# Patient Record
Sex: MALE | Race: WHITE | URBAN - METROPOLITAN AREA
[De-identification: names, ages, dates, MRNs, and addresses within clinical notes are randomized per-mention and may not be internally consistent; named-entity substitution may affect disease eponyms.]

---

## 2024-04-13 ENCOUNTER — EMERGENCY (EMERGENCY)
Facility: HOSPITAL | Age: 79
LOS: 0 days | Discharge: HOME | End: 2024-04-14
Attending: EMERGENCY MEDICINE
Payer: MEDICARE

## 2024-04-13 VITALS
RESPIRATION RATE: 18 BRPM | TEMPERATURE: 98 F | DIASTOLIC BLOOD PRESSURE: 54 MMHG | SYSTOLIC BLOOD PRESSURE: 99 MMHG | OXYGEN SATURATION: 97 % | HEART RATE: 70 BPM | WEIGHT: 164.91 LBS

## 2024-04-13 DIAGNOSIS — R55 SYNCOPE AND COLLAPSE: ICD-10-CM

## 2024-04-13 DIAGNOSIS — F03.90 UNSPECIFIED DEMENTIA WITHOUT BEHAVIORAL DISTURBANCE: ICD-10-CM

## 2024-04-13 DIAGNOSIS — I25.10 ATHEROSCLEROTIC HEART DISEASE OF NATIVE CORONARY ARTERY WITHOUT ANGINA PECTORIS: ICD-10-CM

## 2024-04-13 DIAGNOSIS — I51.7 CARDIOMEGALY: ICD-10-CM

## 2024-04-13 DIAGNOSIS — E78.5 HYPERLIPIDEMIA, UNSPECIFIED: ICD-10-CM

## 2024-04-13 DIAGNOSIS — I27.20 PULMONARY HYPERTENSION, UNSPECIFIED: ICD-10-CM

## 2024-04-13 DIAGNOSIS — J01.90 ACUTE SINUSITIS, UNSPECIFIED: ICD-10-CM

## 2024-04-13 DIAGNOSIS — R07.89 OTHER CHEST PAIN: ICD-10-CM

## 2024-04-13 LAB
ALBUMIN SERPL ELPH-MCNC: 3.8 G/DL — SIGNIFICANT CHANGE UP (ref 3.5–5.2)
ALP SERPL-CCNC: 116 U/L — HIGH (ref 30–115)
ALT FLD-CCNC: 12 U/L — SIGNIFICANT CHANGE UP (ref 0–41)
ANION GAP SERPL CALC-SCNC: 13 MMOL/L — SIGNIFICANT CHANGE UP (ref 7–14)
AST SERPL-CCNC: 29 U/L — SIGNIFICANT CHANGE UP (ref 0–41)
BASOPHILS # BLD AUTO: 0.03 K/UL — SIGNIFICANT CHANGE UP (ref 0–0.2)
BASOPHILS NFR BLD AUTO: 0.3 % — SIGNIFICANT CHANGE UP (ref 0–1)
BILIRUB SERPL-MCNC: 0.3 MG/DL — SIGNIFICANT CHANGE UP (ref 0.2–1.2)
BUN SERPL-MCNC: 26 MG/DL — HIGH (ref 10–20)
CALCIUM SERPL-MCNC: 9.1 MG/DL — SIGNIFICANT CHANGE UP (ref 8.4–10.5)
CHLORIDE SERPL-SCNC: 102 MMOL/L — SIGNIFICANT CHANGE UP (ref 98–110)
CO2 SERPL-SCNC: 22 MMOL/L — SIGNIFICANT CHANGE UP (ref 17–32)
CREAT SERPL-MCNC: 1.4 MG/DL — SIGNIFICANT CHANGE UP (ref 0.7–1.5)
EGFR: 51 ML/MIN/1.73M2 — LOW
EOSINOPHIL # BLD AUTO: 0.12 K/UL — SIGNIFICANT CHANGE UP (ref 0–0.7)
EOSINOPHIL NFR BLD AUTO: 1.4 % — SIGNIFICANT CHANGE UP (ref 0–8)
GLUCOSE SERPL-MCNC: 153 MG/DL — HIGH (ref 70–99)
HCT VFR BLD CALC: 37.9 % — LOW (ref 42–52)
HGB BLD-MCNC: 12.4 G/DL — LOW (ref 14–18)
IMM GRANULOCYTES NFR BLD AUTO: 0.6 % — HIGH (ref 0.1–0.3)
LYMPHOCYTES # BLD AUTO: 1.54 K/UL — SIGNIFICANT CHANGE UP (ref 1.2–3.4)
LYMPHOCYTES # BLD AUTO: 17.5 % — LOW (ref 20.5–51.1)
MAGNESIUM SERPL-MCNC: 2.3 MG/DL — SIGNIFICANT CHANGE UP (ref 1.8–2.4)
MCHC RBC-ENTMCNC: 31.3 PG — HIGH (ref 27–31)
MCHC RBC-ENTMCNC: 32.7 G/DL — SIGNIFICANT CHANGE UP (ref 32–37)
MCV RBC AUTO: 95.7 FL — HIGH (ref 80–94)
MONOCYTES # BLD AUTO: 0.72 K/UL — HIGH (ref 0.1–0.6)
MONOCYTES NFR BLD AUTO: 8.2 % — SIGNIFICANT CHANGE UP (ref 1.7–9.3)
NEUTROPHILS # BLD AUTO: 6.35 K/UL — SIGNIFICANT CHANGE UP (ref 1.4–6.5)
NEUTROPHILS NFR BLD AUTO: 72 % — SIGNIFICANT CHANGE UP (ref 42.2–75.2)
NRBC # BLD: 0 /100 WBCS — SIGNIFICANT CHANGE UP (ref 0–0)
PLATELET # BLD AUTO: 238 K/UL — SIGNIFICANT CHANGE UP (ref 130–400)
PMV BLD: 11.9 FL — HIGH (ref 7.4–10.4)
POTASSIUM SERPL-MCNC: 5.2 MMOL/L — HIGH (ref 3.5–5)
POTASSIUM SERPL-SCNC: 5.2 MMOL/L — HIGH (ref 3.5–5)
PROT SERPL-MCNC: 6.3 G/DL — SIGNIFICANT CHANGE UP (ref 6–8)
RBC # BLD: 3.96 M/UL — LOW (ref 4.7–6.1)
RBC # FLD: 12.5 % — SIGNIFICANT CHANGE UP (ref 11.5–14.5)
SODIUM SERPL-SCNC: 137 MMOL/L — SIGNIFICANT CHANGE UP (ref 135–146)
TROPONIN T, HIGH SENSITIVITY RESULT: 38 NG/L — HIGH (ref 6–21)
TROPONIN T, HIGH SENSITIVITY RESULT: 42 NG/L — HIGH (ref 6–21)
TROPONIN T, HIGH SENSITIVITY RESULT: 44 NG/L — HIGH (ref 6–21)
WBC # BLD: 8.81 K/UL — SIGNIFICANT CHANGE UP (ref 4.8–10.8)
WBC # FLD AUTO: 8.81 K/UL — SIGNIFICANT CHANGE UP (ref 4.8–10.8)

## 2024-04-13 PROCEDURE — 80053 COMPREHEN METABOLIC PANEL: CPT

## 2024-04-13 PROCEDURE — G0378: CPT

## 2024-04-13 PROCEDURE — 93306 TTE W/DOPPLER COMPLETE: CPT

## 2024-04-13 PROCEDURE — 85025 COMPLETE CBC W/AUTO DIFF WBC: CPT

## 2024-04-13 PROCEDURE — 71045 X-RAY EXAM CHEST 1 VIEW: CPT | Mod: 26

## 2024-04-13 PROCEDURE — 84484 ASSAY OF TROPONIN QUANT: CPT

## 2024-04-13 PROCEDURE — 93005 ELECTROCARDIOGRAM TRACING: CPT

## 2024-04-13 PROCEDURE — 93010 ELECTROCARDIOGRAM REPORT: CPT

## 2024-04-13 PROCEDURE — 83735 ASSAY OF MAGNESIUM: CPT

## 2024-04-13 PROCEDURE — 36415 COLL VENOUS BLD VENIPUNCTURE: CPT

## 2024-04-13 PROCEDURE — 99223 1ST HOSP IP/OBS HIGH 75: CPT

## 2024-04-13 PROCEDURE — 99285 EMERGENCY DEPT VISIT HI MDM: CPT

## 2024-04-13 PROCEDURE — 71045 X-RAY EXAM CHEST 1 VIEW: CPT

## 2024-04-13 RX ORDER — NIFEDIPINE 30 MG
60 TABLET, EXTENDED RELEASE 24 HR ORAL DAILY
Refills: 0 | Status: DISCONTINUED | OUTPATIENT
Start: 2024-04-13 | End: 2024-04-14

## 2024-04-13 RX ORDER — SODIUM CHLORIDE 9 MG/ML
1000 INJECTION, SOLUTION INTRAVENOUS ONCE
Refills: 0 | Status: COMPLETED | OUTPATIENT
Start: 2024-04-13 | End: 2024-04-13

## 2024-04-13 RX ORDER — LANOLIN ALCOHOL/MO/W.PET/CERES
5 CREAM (GRAM) TOPICAL AT BEDTIME
Refills: 0 | Status: DISCONTINUED | OUTPATIENT
Start: 2024-04-13 | End: 2024-04-14

## 2024-04-13 RX ORDER — DIVALPROEX SODIUM 500 MG/1
250 TABLET, DELAYED RELEASE ORAL DAILY
Refills: 0 | Status: DISCONTINUED | OUTPATIENT
Start: 2024-04-13 | End: 2024-04-14

## 2024-04-13 RX ORDER — FOLIC ACID 0.8 MG
1 TABLET ORAL DAILY
Refills: 0 | Status: DISCONTINUED | OUTPATIENT
Start: 2024-04-13 | End: 2024-04-14

## 2024-04-13 RX ORDER — DONEPEZIL HYDROCHLORIDE 10 MG/1
10 TABLET, FILM COATED ORAL AT BEDTIME
Refills: 0 | Status: DISCONTINUED | OUTPATIENT
Start: 2024-04-13 | End: 2024-04-14

## 2024-04-13 RX ORDER — METOPROLOL TARTRATE 50 MG
25 TABLET ORAL
Refills: 0 | Status: DISCONTINUED | OUTPATIENT
Start: 2024-04-13 | End: 2024-04-14

## 2024-04-13 RX ORDER — CITALOPRAM 10 MG/1
20 TABLET, FILM COATED ORAL DAILY
Refills: 0 | Status: DISCONTINUED | OUTPATIENT
Start: 2024-04-13 | End: 2024-04-14

## 2024-04-13 RX ORDER — ATORVASTATIN CALCIUM 80 MG/1
80 TABLET, FILM COATED ORAL AT BEDTIME
Refills: 0 | Status: DISCONTINUED | OUTPATIENT
Start: 2024-04-13 | End: 2024-04-14

## 2024-04-13 RX ADMIN — ATORVASTATIN CALCIUM 80 MILLIGRAM(S): 80 TABLET, FILM COATED ORAL at 22:34

## 2024-04-13 RX ADMIN — DIVALPROEX SODIUM 250 MILLIGRAM(S): 500 TABLET, DELAYED RELEASE ORAL at 22:34

## 2024-04-13 RX ADMIN — Medication 5 MILLIGRAM(S): at 22:34

## 2024-04-13 RX ADMIN — CITALOPRAM 20 MILLIGRAM(S): 10 TABLET, FILM COATED ORAL at 22:34

## 2024-04-13 RX ADMIN — Medication 25 MILLIGRAM(S): at 21:28

## 2024-04-13 RX ADMIN — DONEPEZIL HYDROCHLORIDE 10 MILLIGRAM(S): 10 TABLET, FILM COATED ORAL at 22:34

## 2024-04-13 NOTE — ED CDU PROVIDER INITIAL DAY NOTE - PROGRESS NOTE DETAILS
received signout from Janes Lawson - in obs for evaluation of  syncope; ce/ekg x 2 42->44 will repeat 3rd trop

## 2024-04-13 NOTE — ED PROVIDER NOTE - CLINICAL SUMMARY MEDICAL DECISION MAKING FREE TEXT BOX
78-year-old man, history of hypertension presents for syncopal episode without significant prodrome.  No prior episodes, no chest pain, shortness of breath, palpitations.  No recent cardiac workup.  Vital signs, exam as noted, patient nontoxic-appearing, lungs clear, CV S1-S2, abdomen soft, nontender.  EKG interpreted by me, Dr. Malinda Domingo, normal sinus@64, normal axis, normal intervals, no acute ST–T changes.  Chest x-ray interpreted by me, normal cardiac silhouette, no pneumothorax, no infiltrate.  Labs unremarkable with troponin 42.  Symptoms likely orthostatic, however given age, and relative lack of prodromal symptoms, elevated troponin, patient to be placed in CDU for monitoring and further evaluation.

## 2024-04-13 NOTE — ED PROVIDER NOTE - PHYSICAL EXAMINATION
Vital Signs: I have reviewed the initial vital signs.  Constitutional: chronically ill appearing, no acute distress  HEENT: Airway patent, moist MM, EOMI,   CV: regular rate, regular rhythm, well-perfused extremities,   Lungs: Clear to ascultation bilaterally,   ABD: Non-tender, Non-distended,   MSK: Neck supple, nontender, normal range of motion,   INTEG: Skin warm, dry, no rash.  NEURO: A&Ox3, moving all extremities, normal speech

## 2024-04-13 NOTE — ED CDU PROVIDER INITIAL DAY NOTE - CLINICAL SUMMARY MEDICAL DECISION MAKING FREE TEXT BOX
Pt presents with syncope. Placed into observation for monitoring and for echo. Pt takes meds for HTN, psych, HLD and for dementia.

## 2024-04-13 NOTE — ED PROVIDER NOTE - CONSIDERATION OF ADMISSION OBSERVATION
Consideration of Admission/Observation Pt requires telemetry, serial EKG/enzymes ,advanced cardiac imaging

## 2024-04-13 NOTE — ED PROVIDER NOTE - OBJECTIVE STATEMENT
78y M hx of HTN presenting for eval of syncopal episode. Patient was walking and then felt "something was off". Afterwards slumped down to the floor. (-) HT, (-) LoC. Patient denies preceding chest pain, palpitations, never had anything like this happen before. Does not recall if he's had a recent cardiac work up.

## 2024-04-14 VITALS
RESPIRATION RATE: 18 BRPM | SYSTOLIC BLOOD PRESSURE: 131 MMHG | OXYGEN SATURATION: 96 % | TEMPERATURE: 98 F | HEART RATE: 65 BPM | DIASTOLIC BLOOD PRESSURE: 63 MMHG

## 2024-04-14 PROCEDURE — 99284 EMERGENCY DEPT VISIT MOD MDM: CPT

## 2024-04-14 PROCEDURE — 93306 TTE W/DOPPLER COMPLETE: CPT | Mod: 26

## 2024-04-14 PROCEDURE — 99238 HOSP IP/OBS DSCHRG MGMT 30/<: CPT

## 2024-04-14 RX ADMIN — Medication 25 MILLIGRAM(S): at 16:04

## 2024-04-14 RX ADMIN — Medication 1 MILLIGRAM(S): at 10:13

## 2024-04-14 RX ADMIN — ATORVASTATIN CALCIUM 80 MILLIGRAM(S): 80 TABLET, FILM COATED ORAL at 22:19

## 2024-04-14 RX ADMIN — Medication 60 MILLIGRAM(S): at 06:49

## 2024-04-14 RX ADMIN — Medication 5 MILLIGRAM(S): at 22:19

## 2024-04-14 RX ADMIN — DONEPEZIL HYDROCHLORIDE 10 MILLIGRAM(S): 10 TABLET, FILM COATED ORAL at 22:19

## 2024-04-14 RX ADMIN — Medication 25 MILLIGRAM(S): at 06:49

## 2024-04-14 RX ADMIN — CITALOPRAM 20 MILLIGRAM(S): 10 TABLET, FILM COATED ORAL at 10:13

## 2024-04-14 RX ADMIN — DIVALPROEX SODIUM 250 MILLIGRAM(S): 500 TABLET, DELAYED RELEASE ORAL at 10:12

## 2024-04-14 NOTE — ED CDU PROVIDER SUBSEQUENT DAY NOTE - PROGRESS NOTE DETAILS
patient signed out from pa fearns, no complaint will, continue to monitor awaiting echo spoke to dr. mckoy cardiology fellow will evaluate patient spoke to cardiology fellow dr. alexandra patient can be seen outpatient cardiology case d/w dr. huffman

## 2024-04-14 NOTE — CONSULT NOTE ADULT - SUBJECTIVE AND OBJECTIVE BOX
Patient seen and evaluated at bedside    Chief Complaint:    HPI:   78y M hx of HTN presenting for eval of syncopal episode. Patient was walking and then felt "something was off". Afterwards slumped down to the floor. (-) HT, (-) LoC. Patient denies preceding chest pain, palpitations, never had anything like this happen before. Does not recall if he's had a recent cardiac work up.   Troponin T, High Sensitivity: 38 ng/L (04-13-24 @ 20:17)  Troponin T, High Sensitivity: 44 ng/L (04-13-24 @ 16:54)  Troponin T, High Sensitivity: 42 ng/L (04-13-24 @ 14:05)        PMHx:       PSHx:       Allergies:  No Known Allergies      Home Meds:    Current Medications:   atorvastatin 80 milliGRAM(s) Oral at bedtime  busPIRone 10 milliGRAM(s) Oral once  citalopram 20 milliGRAM(s) Oral daily  divalproex  milliGRAM(s) Oral daily  donepezil 10 milliGRAM(s) Oral at bedtime  folic acid 1 milliGRAM(s) Oral daily  melatonin. 5 milliGRAM(s) Oral at bedtime  metoprolol tartrate 25 milliGRAM(s) Oral two times a day  NIFEdipine XL 60 milliGRAM(s) Oral daily      FAMILY HISTORY:      Social History:  Smoking History:  Alcohol Use:  Drug Use:    REVIEW OF SYSTEMS:  Constitutional:     [ ] negative [ ] fevers [ ] chills [ ] weight loss [ ] weight gain  HEENT:                  [ ] negative [ ] dry eyes [ ] eye irritation [ ] postnasal drip [ ] nasal congestion  CV:                         [ ] negative  [ ] chest pain [ ] orthopnea [ ] palpitations [ ] murmur  Resp:                     [ ] negative [ ] cough [ ] shortness of breath [ ] dyspnea [ ] wheezing [ ] sputum [ ]hemoptysis  GI:                          [ ] negative [ ] nausea [ ] vomiting [ ] diarrhea [ ] constipation [ ] abd pain [ ] dysphagia   :                        [ ] negative [ ] dysuria [ ] nocturia [ ] hematuria [ ] increased urinary frequency  Musculoskeletal: [ ] negative [ ] back pain [ ] myalgias [ ] arthralgias [ ] fracture  Skin:                       [ ] negative [ ] rash [ ] itch  Neurological:        [ ] negative [ ] headache [ ] dizziness [ ] syncope [ ] weakness [ ] numbness  Psychiatric:           [ ] negative [ ] anxiety [ ] depression  Endocrine:            [ ] negative [ ] diabetes [ ] thyroid problem  Heme/Lymph:      [ ] negative [ ] anemia [ ] bleeding problem  Allergic/Immune: [ ] negative [ ] itchy eyes [ ] nasal discharge [ ] hives [ ] angioedema    [ ] All other systems negative  [ ] Unable to assess ROS due to      Physical Exam:  T(F): 97.8 (04-14), Max: 98.2 (04-14)  HR: 76 (04-14) (62 - 77)  BP: 136/64 (04-14) (127/60 - 164/77)  RR: 18 (04-14)  SpO2: 95% (04-14)  GENERAL: No acute distress, well-developed  HEAD:  Atraumatic, Normocephalic  ENT: EOMI, PERRLA, conjunctiva and sclera clear, Neck supple, No JVD, moist mucosa  CHEST/LUNG: Clear to auscultation bilaterally; No wheeze, equal breath sounds bilaterally   BACK: No spinal tenderness  HEART: Regular rate and rhythm; No murmurs, rubs, or gallops  ABDOMEN: Soft, Nontender, Nondistended; Bowel sounds present  EXTREMITIES:  No clubbing, cyanosis, or edema  PSYCH: Nl behavior, nl affect  NEUROLOGY: AAOx3, non-focal, cranial nerves intact  SKIN: Normal color, No rashes or lesions  LINES:    Cardiovascular Diagnostic Testing:    ECG: Personally reviewed:    Echo: Personally reviewed:  Summary:   1. Normal global left ventricular systolic function with a biplane EF of   56%. Severe (grade 3) diastolic dysfunction. No regional wall motion   abnormalities. Moderate concentric hypertrophy along with predominant   basal septal hypertrophy of 2.0cm.   2. Normal right ventricular size and function.   3. Moderately enlarged left atrium.   4. Moderate mitral valve regurgitation.   5. Sclerotic aortic valve with decreased opening and mild-to-moderate   regurgitation.   6. Moderate tricuspid regurgitation.   7. Severe pulmonary hypertension (PASP of at least 72mmHg; the IVC was   suboptimally visualized).   8. There is no evidence of pericardial effusion.   9. Recommend cardiac MRI for further evaluation of asymmetric septal   hypertrophy.    PHYSICIAN INTERPRETATION:  Left Ventricle: Left ventricular wall thickness is increased. There is   moderate concentric left ventricular hypertrophy. Global LV systolic   function was normal. Spectral Doppler shows restrictive pattern of left   ventricular myocardial filling (Grade III diastolic dysfunction).   Elevated left atrial and left ventricular end-diastolic pressures.  Right Ventricle: Normal right ventricular size and function.  Left Atrium: Moderately enlarged left atrium. LA volume Index is 45.1   ml/m² ml/m2.  Right Atrium: Normal right atrial size. RA Area is 12.40 cm² cm2.  Pericardium: There is no evidence of pericardial effusion.  Mitral Valve: Moderate mitral valve regurgitation is seen.  Tricuspid Valve: Structurally normal tricuspid valve, with normal leaflet   excursion. Moderate tricuspid regurgitation is visualized. Estimated   pulmonary artery systolic pressure is 71.6 mmHg assuming a right atrial   pressure of 3 mmHg, which is consistent with severe pulmonary   hypertension.  Aortic Valve: The aortic valve is trileaflet. Sclerotic aortic valve with   decreased opening. Mild to moderate aortic valve regurgitation is seen.  Pulmonic Valve: The pulmonic valve was not well visualized. Trace   pulmonic valve regurgitation.  Aorta: The aortic root and ascending aorta are structurally normal, with   no evidence of dilitation.  Venous: The inferior vena cava is not well visualized.    Stress Testing:      ECG   Ventricular Rate 64 BPM    Atrial Rate 64 BPM    P-R Interval 152 ms    QRS Duration 80 ms    Q-T Interval 438 ms    QTC Calculation(Bazett) 451 ms    P Axis 76 degrees    R Axis 48 degrees    T Axis 7 degrees    Diagnosis Line Normal sinus rhythm  Normal ECG    Confirmed by Nash Dent ([id number]) on 4/13/2024 2:23:41 PM  Cath:    Imaging:    CXR: Personally reviewed    Labs: Personally reviewed                        12.4   8.81  )-----------( 238      ( 13 Apr 2024 14:05 )             37.9     04-13    137  |  102  |  26<H>  ----------------------------<  153<H>  5.2<H>   |  22  |  1.4    Ca    9.1      13 Apr 2024 14:05  Mg     2.3     04-13    TPro  6.3  /  Alb  3.8  /  TBili  0.3  /  DBili  x   /  AST  29  /  ALT  12  /  AlkPhos  116<H>  04-13             Patient seen and evaluated at bedside    Chief Complaint:    HPI:   78y M hx of HTN presenting for eval of syncopal episode. Patient was walking and then felt "something was off". Afterwards slumped down to the floor. (-) HT, (-) LoC. Patient denies preceding chest pain, palpitations, never had anything like this happen before. Does not recall if he's had a recent cardiac work up.   Troponin T, High Sensitivity: 38 ng/L (04-13-24 @ 20:17)  Troponin T, High Sensitivity: 44 ng/L (04-13-24 @ 16:54)  Troponin T, High Sensitivity: 42 ng/L (04-13-24 @ 14:05)        PMHx:       PSHx:       Allergies:  No Known Allergies      Home Meds:    Current Medications:   atorvastatin 80 milliGRAM(s) Oral at bedtime  busPIRone 10 milliGRAM(s) Oral once  citalopram 20 milliGRAM(s) Oral daily  divalproex  milliGRAM(s) Oral daily  donepezil 10 milliGRAM(s) Oral at bedtime  folic acid 1 milliGRAM(s) Oral daily  melatonin. 5 milliGRAM(s) Oral at bedtime  metoprolol tartrate 25 milliGRAM(s) Oral two times a day  NIFEdipine XL 60 milliGRAM(s) Oral daily      FAMILY HISTORY:      Social History:  Smoking History:  Alcohol Use:  Drug Use:    REVIEW OF SYSTEMS:  Constitutional:     [ ] negative [ ] fevers [ ] chills [ ] weight loss [ ] weight gain  HEENT:                  [ ] negative [ ] dry eyes [ ] eye irritation [ ] postnasal drip [ ] nasal congestion  CV:                         [ ] negative  [ ] chest pain [ ] orthopnea [ ] palpitations [ ] murmur  Resp:                     [ ] negative [ ] cough [ ] shortness of breath [ ] dyspnea [ ] wheezing [ ] sputum [ ]hemoptysis  GI:                          [ ] negative [ ] nausea [ ] vomiting [ ] diarrhea [ ] constipation [ ] abd pain [ ] dysphagia   :                        [ ] negative [ ] dysuria [ ] nocturia [ ] hematuria [ ] increased urinary frequency  Musculoskeletal: [ ] negative [ ] back pain [ ] myalgias [ ] arthralgias [ ] fracture  Skin:                       [ ] negative [ ] rash [ ] itch  Neurological:        [ ] negative [ ] headache [ ] dizziness [ ] syncope [ ] weakness [ ] numbness  Psychiatric:           [ ] negative [ ] anxiety [ ] depression  Endocrine:            [ ] negative [ ] diabetes [ ] thyroid problem  Heme/Lymph:      [ ] negative [ ] anemia [ ] bleeding problem  Allergic/Immune: [ ] negative [ ] itchy eyes [ ] nasal discharge [ ] hives [ ] angioedema    [ ] All other systems negative  [ ] Unable to assess ROS due to      Physical Exam:  T(F): 97.8 (04-14), Max: 98.2 (04-14)  HR: 76 (04-14) (62 - 77)  BP: 136/64 (04-14) (127/60 - 164/77)  RR: 18 (04-14)  SpO2: 95% (04-14)  GENERAL: No acute distress, well-developed  HEAD:  Atraumatic, Normocephalic  ENT: EOMI, PERRLA, conjunctiva and sclera clear, Neck supple, No JVD, moist mucosa  CHEST/LUNG: Clear to auscultation bilaterally; No wheeze, equal breath sounds bilaterally   HEART: Regular rate and rhythm; No murmurs, rubs, or gallops  ABDOMEN: Soft, Nontender, Nondistended; Bowel sounds present  EXTREMITIES:  No clubbing, cyanosis, or edema  PSYCH: Nl behavior, nl affect  NEUROLOGY: AAOx2, non-focal, cranial nerves intact  SKIN: Normal color, No rashes or lesions      Cardiovascular Diagnostic Testing:    ECG: Personally reviewed:    Echo: Personally reviewed:  Summary:   1. Normal global left ventricular systolic function with a biplane EF of   56%. Severe (grade 3) diastolic dysfunction. No regional wall motion   abnormalities. Moderate concentric hypertrophy along with predominant   basal septal hypertrophy of 2.0cm.   2. Normal right ventricular size and function.   3. Moderately enlarged left atrium.   4. Moderate mitral valve regurgitation.   5. Sclerotic aortic valve with decreased opening and mild-to-moderate   regurgitation.   6. Moderate tricuspid regurgitation.   7. Severe pulmonary hypertension (PASP of at least 72mmHg; the IVC was   suboptimally visualized).   8. There is no evidence of pericardial effusion.   9. Recommend cardiac MRI for further evaluation of asymmetric septal   hypertrophy.    PHYSICIAN INTERPRETATION:  Left Ventricle: Left ventricular wall thickness is increased. There is   moderate concentric left ventricular hypertrophy. Global LV systolic   function was normal. Spectral Doppler shows restrictive pattern of left   ventricular myocardial filling (Grade III diastolic dysfunction).   Elevated left atrial and left ventricular end-diastolic pressures.  Right Ventricle: Normal right ventricular size and function.  Left Atrium: Moderately enlarged left atrium. LA volume Index is 45.1   ml/m² ml/m2.  Right Atrium: Normal right atrial size. RA Area is 12.40 cm² cm2.  Pericardium: There is no evidence of pericardial effusion.  Mitral Valve: Moderate mitral valve regurgitation is seen.  Tricuspid Valve: Structurally normal tricuspid valve, with normal leaflet   excursion. Moderate tricuspid regurgitation is visualized. Estimated   pulmonary artery systolic pressure is 71.6 mmHg assuming a right atrial   pressure of 3 mmHg, which is consistent with severe pulmonary   hypertension.  Aortic Valve: The aortic valve is trileaflet. Sclerotic aortic valve with   decreased opening. Mild to moderate aortic valve regurgitation is seen.  Pulmonic Valve: The pulmonic valve was not well visualized. Trace   pulmonic valve regurgitation.  Aorta: The aortic root and ascending aorta are structurally normal, with   no evidence of dilitation.  Venous: The inferior vena cava is not well visualized.    Stress Testing:      ECG   Ventricular Rate 64 BPM    Atrial Rate 64 BPM    P-R Interval 152 ms    QRS Duration 80 ms    Q-T Interval 438 ms    QTC Calculation(Bazett) 451 ms    P Axis 76 degrees    R Axis 48 degrees    T Axis 7 degrees    Diagnosis Line Normal sinus rhythm  Normal ECG    Confirmed by Nash Dent ([id number]) on 4/13/2024 2:23:41 PM  Cath:    Imaging:    CXR: Personally reviewed    Labs: Personally reviewed                        12.4   8.81  )-----------( 238      ( 13 Apr 2024 14:05 )             37.9     04-13    137  |  102  |  26<H>  ----------------------------<  153<H>  5.2<H>   |  22  |  1.4    Ca    9.1      13 Apr 2024 14:05  Mg     2.3     04-13    TPro  6.3  /  Alb  3.8  /  TBili  0.3  /  DBili  x   /  AST  29  /  ALT  12  /  AlkPhos  116<H>  04-13

## 2024-04-14 NOTE — CONSULT NOTE ADULT - ASSESSMENT
78 year old male advanced dementia from NH for syncope found  to have severe pHTN on echocardiography. Cardio consult for pHTN.       #pHTN   -PASP 72 mmHg   -patient denies chest pain sob orthopnea suarez.   -poor historian.         *********************************REsident Incomplete note************************************* 78 year old male advanced dementia from NH for syncope found  to have severe pHTN on echocardiography. Cardio consult for pHTN.       #pHTN   -PASP 72 mmHg   -patient denies chest pain sob orthopnea suarez.   -poor historian.     -o/p general cardiology f/u for holter monitor.  78 year old male advanced dementia from NH for syncope found to have severe pHTN on echocardiography. Cardio consult for pHTN and syncope.       #pHTN   -PASP 72 mmHg   -patient denies chest pain sob orthopnea suarez.   -poor historian.   -outpatient follow up with cardiology    Sycnope  -can schedule outpatient holter monitor for assessment of arrhythmia with outpatient cardiology follow up

## 2024-04-14 NOTE — ED ADULT NURSE REASSESSMENT NOTE - NS ED NURSE REASSESS COMMENT FT1
Patient updated on plan of care and understanding was verbalized. Patient has no complaints at this time

## 2024-04-14 NOTE — ED ADULT NURSE REASSESSMENT NOTE - NS ED NURSE REASSESS COMMENT FT1
Pt is alert and oriented 3, denies pains, no syncopal episode so far. He vital signs were stable, took his night medication well. Pt is scheduled for Echo this morning, to continue monitoring. Fall Risk

## 2024-04-14 NOTE — ED CDU PROVIDER DISPOSITION NOTE - CARE PROVIDER_API CALL
Kem Connelly Y  Interventional Cardiology  [address] [place] [address], [address]  [place] [address]  Phone: [phone]  Fax: [phone]  Follow Up Time: 4-6 Days

## 2024-04-14 NOTE — ED ADULT NURSE REASSESSMENT NOTE - NS ED NURSE REASSESS COMMENT FT1
Pt assessed at the bedside on cardiac monitoring. IV access patent. Pt denies CP/SOB at this time. Updated on the plan of care. Fall precautions in effect. Pt close to the nurses station. Oriented to call bell system. Verbalized understanding.

## 2024-04-14 NOTE — ED CDU PROVIDER DISPOSITION NOTE - PATIENT PORTAL LINK FT
You can access the FollowMyHealth Patient Portal offered by [facility] by registering at the following website: [email or web address]. By joining [facility]’s FollowMyHealth portal, you will also be able to view your health information using other applications (apps) compatible with our system.

## 2024-04-14 NOTE — ED CDU PROVIDER DISPOSITION NOTE - CLINICAL COURSE
Pt had a history of CAD. Presented with chest pain and syncope. . CE positive. Stress test ordered. Pt left ama.

## 2024-04-14 NOTE — ED CDU PROVIDER SUBSEQUENT DAY NOTE - ATTENDING APP SHARED VISIT CONTRIBUTION OF CARE
Pt is a poor historian. Presents with syncope. On exam s1S2 rrr, + murmur audible. lungs clear. no edema lower ext.

## 2024-04-14 NOTE — ED CDU PROVIDER SUBSEQUENT DAY NOTE - CLINICAL SUMMARY MEDICAL DECISION MAKING FREE TEXT BOX
Pt presents with syncope. Pt does not recall event. Echo abnormal for pulmonary hypertension and concentric LVH. Cardiology consult.

## 2024-05-06 ENCOUNTER — NON-APPOINTMENT (OUTPATIENT)
Age: 79
End: 2024-05-06

## 2024-05-07 PROBLEM — Z00.00 ENCOUNTER FOR PREVENTIVE HEALTH EXAMINATION: Status: ACTIVE | Noted: 2024-05-07

## 2024-05-20 ENCOUNTER — EMERGENCY (EMERGENCY)
Facility: HOSPITAL | Age: 79
LOS: 0 days | Discharge: HOME | End: 2024-05-20
Attending: EMERGENCY MEDICINE
Payer: MEDICARE

## 2024-05-20 VITALS
RESPIRATION RATE: 18 BRPM | DIASTOLIC BLOOD PRESSURE: 39 MMHG | OXYGEN SATURATION: 98 % | TEMPERATURE: 98 F | HEIGHT: 67 IN | SYSTOLIC BLOOD PRESSURE: 84 MMHG | HEART RATE: 67 BPM | WEIGHT: 175.05 LBS

## 2024-05-20 VITALS
SYSTOLIC BLOOD PRESSURE: 123 MMHG | RESPIRATION RATE: 18 BRPM | HEART RATE: 80 BPM | DIASTOLIC BLOOD PRESSURE: 68 MMHG | OXYGEN SATURATION: 97 %

## 2024-05-20 DIAGNOSIS — I95.9 HYPOTENSION, UNSPECIFIED: ICD-10-CM

## 2024-05-20 DIAGNOSIS — R55 SYNCOPE AND COLLAPSE: ICD-10-CM

## 2024-05-20 DIAGNOSIS — Z20.822 CONTACT WITH AND (SUSPECTED) EXPOSURE TO COVID-19: ICD-10-CM

## 2024-05-20 DIAGNOSIS — I10 ESSENTIAL (PRIMARY) HYPERTENSION: ICD-10-CM

## 2024-05-20 LAB
ALBUMIN SERPL ELPH-MCNC: 4.2 G/DL — SIGNIFICANT CHANGE UP (ref 3.5–5.2)
ALP SERPL-CCNC: 131 U/L — HIGH (ref 30–115)
ALT FLD-CCNC: 14 U/L — SIGNIFICANT CHANGE UP (ref 0–41)
ANION GAP SERPL CALC-SCNC: 17 MMOL/L — HIGH (ref 7–14)
AST SERPL-CCNC: 38 U/L — SIGNIFICANT CHANGE UP (ref 0–41)
BASE EXCESS BLDV CALC-SCNC: -0.8 MMOL/L — SIGNIFICANT CHANGE UP (ref -2–3)
BASE EXCESS BLDV CALC-SCNC: -1.7 MMOL/L — SIGNIFICANT CHANGE UP (ref -2–3)
BASOPHILS # BLD AUTO: 0.08 K/UL — SIGNIFICANT CHANGE UP (ref 0–0.2)
BASOPHILS NFR BLD AUTO: 0.7 % — SIGNIFICANT CHANGE UP (ref 0–1)
BILIRUB SERPL-MCNC: 0.5 MG/DL — SIGNIFICANT CHANGE UP (ref 0.2–1.2)
BUN SERPL-MCNC: 28 MG/DL — HIGH (ref 10–20)
CA-I SERPL-SCNC: 1.12 MMOL/L — LOW (ref 1.15–1.33)
CA-I SERPL-SCNC: 1.16 MMOL/L — SIGNIFICANT CHANGE UP (ref 1.15–1.33)
CALCIUM SERPL-MCNC: 9.2 MG/DL — SIGNIFICANT CHANGE UP (ref 8.4–10.5)
CHLORIDE SERPL-SCNC: 100 MMOL/L — SIGNIFICANT CHANGE UP (ref 98–110)
CO2 SERPL-SCNC: 22 MMOL/L — SIGNIFICANT CHANGE UP (ref 17–32)
CREAT SERPL-MCNC: 1.7 MG/DL — HIGH (ref 0.7–1.5)
EGFR: 40 ML/MIN/1.73M2 — LOW
EOSINOPHIL # BLD AUTO: 0.13 K/UL — SIGNIFICANT CHANGE UP (ref 0–0.7)
EOSINOPHIL NFR BLD AUTO: 1.1 % — SIGNIFICANT CHANGE UP (ref 0–8)
FLUAV AG NPH QL: SIGNIFICANT CHANGE UP
FLUBV AG NPH QL: SIGNIFICANT CHANGE UP
GAS PNL BLDV: 129 MMOL/L — LOW (ref 136–145)
GAS PNL BLDV: 131 MMOL/L — LOW (ref 136–145)
GAS PNL BLDV: SIGNIFICANT CHANGE UP
GLUCOSE SERPL-MCNC: 200 MG/DL — HIGH (ref 70–99)
HCO3 BLDV-SCNC: 24 MMOL/L — SIGNIFICANT CHANGE UP (ref 22–29)
HCO3 BLDV-SCNC: 25 MMOL/L — SIGNIFICANT CHANGE UP (ref 22–29)
HCT VFR BLD CALC: 41 % — LOW (ref 42–52)
HCT VFR BLDA CALC: 39 % — SIGNIFICANT CHANGE UP (ref 39–51)
HCT VFR BLDA CALC: 52 % — HIGH (ref 39–51)
HGB BLD CALC-MCNC: 13.1 G/DL — SIGNIFICANT CHANGE UP (ref 12.6–17.4)
HGB BLD CALC-MCNC: 17.2 G/DL — SIGNIFICANT CHANGE UP (ref 12.6–17.4)
HGB BLD-MCNC: 13.8 G/DL — LOW (ref 14–18)
IMM GRANULOCYTES NFR BLD AUTO: 0.4 % — HIGH (ref 0.1–0.3)
LACTATE BLDV-MCNC: 5.6 MMOL/L — CRITICAL HIGH (ref 0.5–2)
LACTATE BLDV-MCNC: 6.1 MMOL/L — CRITICAL HIGH (ref 0.5–2)
LYMPHOCYTES # BLD AUTO: 2.56 K/UL — SIGNIFICANT CHANGE UP (ref 1.2–3.4)
LYMPHOCYTES # BLD AUTO: 22.3 % — SIGNIFICANT CHANGE UP (ref 20.5–51.1)
MCHC RBC-ENTMCNC: 31.3 PG — HIGH (ref 27–31)
MCHC RBC-ENTMCNC: 33.7 G/DL — SIGNIFICANT CHANGE UP (ref 32–37)
MCV RBC AUTO: 93 FL — SIGNIFICANT CHANGE UP (ref 80–94)
MONOCYTES # BLD AUTO: 0.94 K/UL — HIGH (ref 0.1–0.6)
MONOCYTES NFR BLD AUTO: 8.2 % — SIGNIFICANT CHANGE UP (ref 1.7–9.3)
NEUTROPHILS # BLD AUTO: 7.73 K/UL — HIGH (ref 1.4–6.5)
NEUTROPHILS NFR BLD AUTO: 67.3 % — SIGNIFICANT CHANGE UP (ref 42.2–75.2)
NRBC # BLD: 0 /100 WBCS — SIGNIFICANT CHANGE UP (ref 0–0)
PCO2 BLDV: 41 MMHG — LOW (ref 42–55)
PCO2 BLDV: 47 MMHG — SIGNIFICANT CHANGE UP (ref 42–55)
PH BLDV: 7.33 — SIGNIFICANT CHANGE UP (ref 7.32–7.43)
PH BLDV: 7.38 — SIGNIFICANT CHANGE UP (ref 7.32–7.43)
PLATELET # BLD AUTO: 259 K/UL — SIGNIFICANT CHANGE UP (ref 130–400)
PMV BLD: 12.7 FL — HIGH (ref 7.4–10.4)
PO2 BLDV: 31 MMHG — SIGNIFICANT CHANGE UP (ref 25–45)
PO2 BLDV: 41 MMHG — SIGNIFICANT CHANGE UP (ref 25–45)
POTASSIUM BLDV-SCNC: 4.1 MMOL/L — SIGNIFICANT CHANGE UP (ref 3.5–5.1)
POTASSIUM BLDV-SCNC: 4.4 MMOL/L — SIGNIFICANT CHANGE UP (ref 3.5–5.1)
POTASSIUM SERPL-MCNC: 5.1 MMOL/L — HIGH (ref 3.5–5)
POTASSIUM SERPL-SCNC: 5.1 MMOL/L — HIGH (ref 3.5–5)
PROT SERPL-MCNC: 6.8 G/DL — SIGNIFICANT CHANGE UP (ref 6–8)
RBC # BLD: 4.41 M/UL — LOW (ref 4.7–6.1)
RBC # FLD: 12.2 % — SIGNIFICANT CHANGE UP (ref 11.5–14.5)
RSV RNA NPH QL NAA+NON-PROBE: SIGNIFICANT CHANGE UP
SAO2 % BLDV: 48.3 % — LOW (ref 67–88)
SAO2 % BLDV: 65.2 % — LOW (ref 67–88)
SARS-COV-2 RNA SPEC QL NAA+PROBE: SIGNIFICANT CHANGE UP
SODIUM SERPL-SCNC: 139 MMOL/L — SIGNIFICANT CHANGE UP (ref 135–146)
WBC # BLD: 11.49 K/UL — HIGH (ref 4.8–10.8)
WBC # FLD AUTO: 11.49 K/UL — HIGH (ref 4.8–10.8)

## 2024-05-20 PROCEDURE — 99285 EMERGENCY DEPT VISIT HI MDM: CPT | Mod: 25

## 2024-05-20 PROCEDURE — 85018 HEMOGLOBIN: CPT

## 2024-05-20 PROCEDURE — 80053 COMPREHEN METABOLIC PANEL: CPT

## 2024-05-20 PROCEDURE — 93010 ELECTROCARDIOGRAM REPORT: CPT

## 2024-05-20 PROCEDURE — 82330 ASSAY OF CALCIUM: CPT

## 2024-05-20 PROCEDURE — 36000 PLACE NEEDLE IN VEIN: CPT | Mod: XU

## 2024-05-20 PROCEDURE — 0241U: CPT

## 2024-05-20 PROCEDURE — 36415 COLL VENOUS BLD VENIPUNCTURE: CPT

## 2024-05-20 PROCEDURE — 99285 EMERGENCY DEPT VISIT HI MDM: CPT

## 2024-05-20 PROCEDURE — 82803 BLOOD GASES ANY COMBINATION: CPT

## 2024-05-20 PROCEDURE — 85014 HEMATOCRIT: CPT

## 2024-05-20 PROCEDURE — 84132 ASSAY OF SERUM POTASSIUM: CPT

## 2024-05-20 PROCEDURE — 74177 CT ABD & PELVIS W/CONTRAST: CPT | Mod: MC

## 2024-05-20 PROCEDURE — 84295 ASSAY OF SERUM SODIUM: CPT

## 2024-05-20 PROCEDURE — 83605 ASSAY OF LACTIC ACID: CPT

## 2024-05-20 PROCEDURE — 71045 X-RAY EXAM CHEST 1 VIEW: CPT | Mod: 26

## 2024-05-20 PROCEDURE — 93005 ELECTROCARDIOGRAM TRACING: CPT

## 2024-05-20 PROCEDURE — 85025 COMPLETE CBC W/AUTO DIFF WBC: CPT

## 2024-05-20 PROCEDURE — 71045 X-RAY EXAM CHEST 1 VIEW: CPT

## 2024-05-20 PROCEDURE — 74177 CT ABD & PELVIS W/CONTRAST: CPT | Mod: 26,MC

## 2024-05-20 PROCEDURE — 82962 GLUCOSE BLOOD TEST: CPT

## 2024-05-20 RX ORDER — SODIUM CHLORIDE 9 MG/ML
1000 INJECTION, SOLUTION INTRAVENOUS ONCE
Refills: 0 | Status: COMPLETED | OUTPATIENT
Start: 2024-05-20 | End: 2024-05-20

## 2024-05-20 RX ADMIN — SODIUM CHLORIDE 1000 MILLILITER(S): 9 INJECTION, SOLUTION INTRAVENOUS at 15:43

## 2024-05-20 RX ADMIN — SODIUM CHLORIDE 1000 MILLILITER(S): 9 INJECTION, SOLUTION INTRAVENOUS at 14:12

## 2024-05-20 NOTE — ED PROVIDER NOTE - PHYSICAL EXAMINATION
GENERAL: Well-developed; well-nourished; in no acute distress.   SKIN: warm, dry  HEAD: Normocephalic; atraumatic.  EYES: PERRLA, EOMI, no conjunctival erythema  ENT: No nasal discharge; airway clear.  NECK: Supple; non tender.  CARD: S1, S2 normal; no murmurs, gallops, or rubs. Regular rate and rhythm.   RESP: LCTAB; No wheezes, rales, rhonchi, or stridor.  ABD: soft, nontender, and nondistended  EXT: Normal ROM.  No LE TTP or edema bilaterally.  NEURO: A/ox3, grossly unremarkable  PSYCH: Cooperative, appropriate.

## 2024-05-20 NOTE — ED ADULT NURSE NOTE - NS ED NURSE LEVEL OF CONSCIOUSNESS SPEECH
Review of Systems/Medical History          Cardiovascular  Exercise tolerance: good,     Pulmonary  Negative pulmonary ROS Sleep apnea (questionable) ,        GI/Hepatic    GERD well controlled,          Comment: incontinence     Endo/Other  Negative endo/other ROS      GYN       Hematology  Negative hematology ROS      Musculoskeletal  Negative musculoskeletal ROS        Neurology  Negative neurology ROS      Psychology   Negative psychology ROS              Physical Exam    Airway    Mallampati score: I  TM Distance: >3 FB  Neck ROM: full     Dental   No notable dental hx     Cardiovascular  Rhythm: regular, Rate: normal,     Pulmonary  Breath sounds clear to auscultation,     Other Findings        Anesthesia Plan  ASA Score- 2     Anesthesia Type- general with ASA Monitors  Additional Monitors:   Airway Plan: LMA  Plan Factors-    Induction- intravenous  Postoperative Plan-     Informed Consent- Anesthetic plan and risks discussed with patient  Speaking Coherently

## 2024-05-20 NOTE — ED PROVIDER NOTE - OBJECTIVE STATEMENT
79-year-old male past medical history of HTN coming from nursing home coming in with complaints of syncope.  Patient had witnessed episode of syncope, did not hit his head.  Multiple blood pressures noted in the field wall hypotensive 70/80 over 40/50. Patient AOx3 and denying any acute complaints at this time whatsoever. Denies any fever, chills, nausea, vomiting, CP, SOB, changes in urination, or changes in bowel movements.

## 2024-05-20 NOTE — ED PROVIDER NOTE - PROGRESS NOTE DETAILS
SG: Patient assessed at bedside.  Patient declining any rectal temperature.  Risks and benefits explained to patient.

## 2024-05-20 NOTE — ED PROVIDER NOTE - ATTENDING CONTRIBUTION TO CARE
I personally evaluated patient. I agree with the findings and plan with all documentation on chart except as documented  in my note.    79-year-old male past medical history of hypertension and presents to the emergency department sent from nursing home after syncopal episode.  This was witnessed by nursing home staff and patient passed out but did not lose consciousness.  EMS was called and in the field patient was hypotensive to 70s over 40s.  No seizure-like activity witnessed.  Patient reports being in normal state of health prior to this episode happening.  Patient no prior episodes of syncope in the past.  Patient denies any headache, numbness, weakness, tingling, chest pain, shortness of breath, abdominal pain.  No history of DVT or PE and patient denies any cardiac history or history of arrhythmias.  Patient denies any fever or chills and has no signs of infectious etiology.    Patient brought to the critical care of their emergency department given patient's hypotension.  Patient was immediately placed on the monitor and vital signs taken and blood pressure was improved.  Full neurological exam is unremarkable.  Patient appears mildly dehydrated but has normal heart and lung exam.  Abdominal exam is completely benign.  Normal calf exam.  EKG performed, IV placed, labs sent.  Patient started on IV fluid resuscitation and was observed on cardiac monitoring.  Initial labs show mild hyperglycemia with white blood cell count of 11,000, creatinine 1.6.  Chest x-ray is clear.  Patient has normal acid-base status.  Given initial elevated lactate, patient given fluids and will repeat after fluid resuscitation.  Patient spoken to about results and plan of care.  Patient continues to be asymptomatic.  Shared decision making utilized and patient would opt for outpatient management if lactate is improving and patient continues to feel well.  Patient was offered admission or op stay as this is the safest option for syncope workup. I personally evaluated patient. I agree with the findings and plan with all documentation on chart except as documented  in my note.    79-year-old male past medical history of hypertension and presents to the emergency department sent from nursing home after syncopal episode.  This was witnessed by nursing home staff and patient passed out but did not lose consciousness.  EMS was called and in the field patient was hypotensive to 70s over 40s.  No seizure-like activity witnessed.  Patient reports being in normal state of health prior to this episode happening.  Patient no prior episodes of syncope in the past.  Patient denies any headache, numbness, weakness, tingling, chest pain, shortness of breath, abdominal pain.  No history of DVT or PE and patient denies any cardiac history or history of arrhythmias.  Patient denies any fever or chills and has no signs of infectious etiology.    Patient brought to the critical care of their emergency department given patient's hypotension.  Patient was immediately placed on the monitor and vital signs taken and blood pressure was improved.  Full neurological exam is unremarkable.  Patient appears mildly dehydrated but has normal heart and lung exam.  Abdominal exam is completely benign.  Normal calf exam.  EKG performed, IV placed, labs sent.  Patient started on IV fluid resuscitation and was observed on cardiac monitoring.  Initial labs show mild hyperglycemia with white blood cell count of 11,000, creatinine 1.6.  Chest x-ray is clear.  Patient has normal acid-base status.  Given initial elevated lactate, patient given fluids and will repeat after fluid resuscitation.  Patient spoken to about results and plan of care.  Patient continues to be asymptomatic.  Shared decision making utilized and patient would opt for outpatient management if lactate is improving and patient continues to feel well.  Patient was offered admission or op stay as this is the safest option for syncope workup. Will follow up repeat lactate, VS, and reassess.

## 2024-05-20 NOTE — ED PROVIDER NOTE - CLINICAL SUMMARY MEDICAL DECISION MAKING FREE TEXT BOX
Endorsed from Dr. Chery  80 yo man here s/p syncope w/ hypotension in field  On arrival to ED, pt aao X 3, no complaints  Initial lactate elevated but pt w/ no signs of hypoperfusion  Pt does not want admission for syncope  no fever, no focal signs of infection    plan: fluids, repeat Lactate, walk test for possible d/c home Endorsed from Dr. Chery  78 yo man here s/p syncope w/ hypotension in field  On arrival to ED, pt aao X 3, no complaints  Initial lactate elevated but pt w/ no signs of hypoperfusion  Pt does not want admission for syncope  no fever, no focal signs of infection    plan: fluids, repeat Lactate, walk test for possible d/c home    1923: CT w/o acute pathology. Pt asymptomatic. Stable gait and neuro intact. Pt declines further w/u for syncope in hospital but will f/u as outpatient.

## 2024-05-20 NOTE — ED ADULT NURSE NOTE - CHIEF COMPLAINT QUOTE
clif ems from [facility] for syncopal episode staff said he was our for about 2 minutes. ems found pt to be hypotensive on arrival 70/40 fs 196

## 2024-05-20 NOTE — ED ADULT NURSE NOTE - NSFALLHARMRISKINTERV_ED_ALL_ED
Assistance OOB with selected safe patient handling equipment if applicable/Assistance with ambulation/Communicate risk of Fall with Harm to all staff, patient, and family/Encourage patient to sit up slowly, dangle for a short time, stand at bedside before walking/Monitor gait and stability/Orthostatic vital signs/Provide visual cue: red socks, yellow wristband, yellow gown, etc/Reinforce activity limits and safety measures with patient and family/Bed in lowest position, wheels locked, appropriate side rails in place/Call bell, personal items and telephone in reach/Instruct patient to call for assistance before getting out of bed/chair/stretcher/Non-slip footwear applied when patient is off stretcher/[place] to call system/Physically safe environment - no spills, clutter or unnecessary equipment/Purposeful Proactive Rounding/Room/bathroom lighting operational, light cord in reach

## 2024-05-20 NOTE — ED ADULT NURSE REASSESSMENT NOTE - NS ED NURSE REASSESS COMMENT FT1
Received pt from previous RN, Pt AxOx3, Pt awaiting transport back to [facility], transport sheet completed and given to ASA by previous RN. Bed alarm in place. Safety measures maintained.

## 2024-05-20 NOTE — ED PROVIDER NOTE - NSFOLLOWUPINSTRUCTIONS_ED_ALL_ED_FT
FOLLOW UP WITH YOUR DOCTOR THIS WEEK FOR REEVALUATION.     RETURN TO ED IMMEDIATELY WITH ANY WORSENING SYMPTOMS, CHEST PAIN, SHORTNESS OF BREATH, ABDOMINAL PAIN, FEVERS, WEAKNESS, DIZZINESS, PERSISTENT OR SEVERE HEADACHE OR ANY OTHER CONCERNS.    You were noted to have what is called, a syncopal episode, which is an event when you temporarily lose consciousness. These events happen for a variety of reasons and you were kept in the hospital to evaluate what the cause of your event was. Thankfully, these events in themselves do not leave any long lasting effects on your body, however, they may happen again if the cause of the problem is not found and treated if need be. Many people never find out what caused their event. If you have been advised to follow up with any doctors, or specialists, please be sure to do so.

## 2024-05-20 NOTE — ED ADULT TRIAGE NOTE - CHIEF COMPLAINT QUOTE
clif ems from [facility] for syncopal episode staff said he was our for about 2 minutes. ems found pt to be hypotensive on arrival 70/40 fs 196

## 2024-05-20 NOTE — ED PROVIDER NOTE - PATIENT PORTAL LINK FT
You can access the FollowMyHealth Patient Portal offered by [facility] by registering at the following website: [email or web address]. By joining "[employer]’s FollowMyHealth portal, you will also be able to view your health information using other applications (apps) compatible with our system.

## 2024-05-21 NOTE — ED POST DISCHARGE NOTE - NSPOSTDCCALLS_ED_ALL_ED_NU
Subjective   Patient ID: Nahum Steward is a 81 y.o. male with a history of history of CABG in 2000, coronary artery bypass graft surgery with   saphenous vein graft to right coronary artery, replacement of mechanical valve with tissue valve in 2019, hypertension, CHF, insomnia, hyperlipidemia, APRYL is presented who presents for Follow-up (6 months).    HPI the patient is presented for follow-up.  He takes all his medications as prescribed.  His blood pressure is well-controlled which runs in between 120-130/80 mmHg. He is seen by nephrology every 3 months.  His kidney function is stable.  His anemia much improved.  His mood is stable.  He sleeps well through the night.  Today he denies shortness of breath or chest pain.  No leg edema.  There is no nausea, vomiting, constipation, or diarrhea.  No abdominal pain.    Review of Systems   Constitutional:  Negative for appetite change, chills, fatigue and fever.   HENT:  Negative for congestion, ear pain, facial swelling, hearing loss, nosebleeds and sore throat.    Eyes:  Negative for pain, discharge and visual disturbance.   Respiratory:  Negative for cough, choking, chest tightness, shortness of breath and wheezing.    Cardiovascular:  Negative for chest pain, palpitations and leg swelling.   Gastrointestinal:  Negative for abdominal distention, abdominal pain, anal bleeding, constipation, diarrhea, nausea and vomiting.   Endocrine: Negative for cold intolerance, heat intolerance, polydipsia, polyphagia and polyuria.   Genitourinary:  Negative for difficulty urinating, frequency, hematuria and urgency.   Musculoskeletal:  Negative for arthralgias, gait problem, joint swelling and myalgias.   Skin:  Negative for color change and rash.   Neurological:  Negative for dizziness, tremors, syncope, weakness, numbness and headaches.   Psychiatric/Behavioral:  Negative for behavioral problems, confusion, sleep disturbance and suicidal ideas. The patient is not nervous/anxious.   "      Objective   /72   Temp 36.1 °C (96.9 °F)   Ht 1.689 m (5' 6.5\")   Wt 88.9 kg (196 lb)   BMI 31.16 kg/m²     Physical Exam  Constitutional:       General: He is not in acute distress.     Appearance: Normal appearance.   HENT:      Head: Normocephalic and atraumatic.      Nose: Nose normal.   Eyes:      Extraocular Movements: Extraocular movements intact.      Conjunctiva/sclera: Conjunctivae normal.      Pupils: Pupils are equal, round, and reactive to light.   Cardiovascular:      Rate and Rhythm: Normal rate and regular rhythm.      Pulses: Normal pulses.      Heart sounds: Normal heart sounds.   Pulmonary:      Effort: Pulmonary effort is normal.      Breath sounds: Normal breath sounds.   Abdominal:      General: Bowel sounds are normal.      Palpations: Abdomen is soft.   Musculoskeletal:         General: Normal range of motion.      Cervical back: Normal range of motion and neck supple.   Neurological:      General: No focal deficit present.      Mental Status: He is alert and oriented to person, place, and time.   Psychiatric:         Mood and Affect: Mood normal.         Behavior: Behavior normal.         Thought Content: Thought content normal.         Judgment: Judgment normal.         Assessment/Plan   CHF  Stable  Continue furosemide 20 mg twice daily  Continue losartan 25 mg daily  Off of carvedilol 3.125 mg due to bradycardia  Check stress test done in 2019  Follow-up with cardiology Dr. Garcia as scheduled     History of CABG in 2000   coronary artery bypass graft surgery with   saphenous vein graft to right coronary artery,   replacement of mechanical valve with tissue valve in 2019,   Stable   Continue blood pressure medications  Continue aspirin 81 mg daily  Continue atorvastatin 10 mg daily  Follow-up with cardiology as scheduled     High blood pressure   Off of carvedilol 3.125 mg due to bradycardia  Continue furosemide 40 mg daily  Continue losartan 25 mg daily  Monitor blood " pressure daily  No added salt diet, do not add salt to your food  Try to exercise every other day for 30 minutes    BPH  Continue tamsulosin 0.4 mg once daily  Started on Finasteride 5 mg by urology  Follow-up with urology Dr. Meehan     Anemia   Much improved  Continue oral iron supplement twice daily  Follow-up with hematology Dr. Huitron     Leg edema  Stable  Elevate feet  Wear compression stockings  Continue furosemide 40 mg daily     COPD  Shortness of breath improved after losing weight  Off of Trelegy  Continue albuterol as needed  follow up with pulmonology      Dyslipidemia  Continue with the low fat, low cholesterol diet  I recommend Mediterranean diet, which include fish, chicken, vegetables and olive oil  Exercise daily for 30 minutes at least 3 times a week  Continue atorvastatin 10 mg daily  Continue omega 3 daily 1000 mg daily    Acid reflux  Avoid caffeine and alcoholic beverages  Avoid spicy and acidic food, such as tomato and citrus fruits  Avoid onions, peppermint and spearmint   Eat small, frequent meals  Do not eat late at night, at least 3 hours before bed  Raise the head of the bed 6-8 inches for sleeping  Wear lose-fitting clothes around your waist   Continue famotidine 20 mg twice daily before meals     Insomnia  Continue ramelteon 8 mg at bedtime    BMI 29.57 kg/m2  Lost 35 pounds with intermittent fasting  Low fat, low cholesterol diet, low carbohydrate diet  Exercise at least 30 minutes daily     Wellness exam  Colonoscopy done in 2018 repeat in 5 years, patient declined since last colonoscopy was normal  Pneumovax 9/2/2022  Flu vaccine declined  COVID-vaccine 3/29/2022  See your dentist twice a year  Yearly eye exam  see dermatology once a year for a skin check.    We obtained fasting blood work including lipid panel and TSH  I will call with results    Follow-up in 3 months     1

## 2024-05-21 NOTE — ED POST DISCHARGE NOTE - DETAILS
Spoke with charge nurse Suze Yost at facility where patient resides. Results discussed and she has the results from discharge papers and will f/u with the physician to obtain further imaging

## 2024-06-22 ENCOUNTER — INPATIENT (INPATIENT)
Facility: HOSPITAL | Age: 79
LOS: 4 days | Discharge: HOME | DRG: 101 | End: 2024-06-27
Attending: INTERNAL MEDICINE | Admitting: STUDENT IN AN ORGANIZED HEALTH CARE EDUCATION/TRAINING PROGRAM
Payer: MEDICARE

## 2024-06-22 VITALS
RESPIRATION RATE: 18 BRPM | DIASTOLIC BLOOD PRESSURE: 87 MMHG | WEIGHT: 169.98 LBS | HEART RATE: 102 BPM | TEMPERATURE: 98 F | OXYGEN SATURATION: 100 % | HEIGHT: 67 IN | SYSTOLIC BLOOD PRESSURE: 127 MMHG

## 2024-06-22 DIAGNOSIS — R56.9 UNSPECIFIED CONVULSIONS: ICD-10-CM

## 2024-06-22 LAB
ALBUMIN SERPL ELPH-MCNC: 3.8 G/DL — SIGNIFICANT CHANGE UP (ref 3.5–5.2)
ALP SERPL-CCNC: 112 U/L — SIGNIFICANT CHANGE UP (ref 30–115)
ALT FLD-CCNC: 10 U/L — SIGNIFICANT CHANGE UP (ref 0–41)
ANION GAP SERPL CALC-SCNC: 12 MMOL/L — SIGNIFICANT CHANGE UP (ref 7–14)
AST SERPL-CCNC: 23 U/L — SIGNIFICANT CHANGE UP (ref 0–41)
BASE EXCESS BLDV CALC-SCNC: -1.1 MMOL/L — SIGNIFICANT CHANGE UP (ref -2–3)
BASOPHILS # BLD AUTO: 0.05 K/UL — SIGNIFICANT CHANGE UP (ref 0–0.2)
BASOPHILS NFR BLD AUTO: 0.4 % — SIGNIFICANT CHANGE UP (ref 0–1)
BILIRUB SERPL-MCNC: 0.4 MG/DL — SIGNIFICANT CHANGE UP (ref 0.2–1.2)
BUN SERPL-MCNC: 31 MG/DL — HIGH (ref 10–20)
CA-I SERPL-SCNC: 1.2 MMOL/L — SIGNIFICANT CHANGE UP (ref 1.15–1.33)
CALCIUM SERPL-MCNC: 8.8 MG/DL — SIGNIFICANT CHANGE UP (ref 8.4–10.4)
CHLORIDE SERPL-SCNC: 104 MMOL/L — SIGNIFICANT CHANGE UP (ref 98–110)
CO2 SERPL-SCNC: 24 MMOL/L — SIGNIFICANT CHANGE UP (ref 17–32)
CREAT SERPL-MCNC: 1.9 MG/DL — HIGH (ref 0.7–1.5)
EGFR: 35 ML/MIN/1.73M2 — LOW
EOSINOPHIL # BLD AUTO: 0.08 K/UL — SIGNIFICANT CHANGE UP (ref 0–0.7)
EOSINOPHIL NFR BLD AUTO: 0.6 % — SIGNIFICANT CHANGE UP (ref 0–8)
ETHANOL SERPL-MCNC: <10 MG/DL — SIGNIFICANT CHANGE UP
GAS PNL BLDV: 135 MMOL/L — LOW (ref 136–145)
GAS PNL BLDV: SIGNIFICANT CHANGE UP
GAS PNL BLDV: SIGNIFICANT CHANGE UP
GLUCOSE SERPL-MCNC: 109 MG/DL — HIGH (ref 70–99)
HCO3 BLDV-SCNC: 25 MMOL/L — SIGNIFICANT CHANGE UP (ref 22–29)
HCT VFR BLD CALC: 37.4 % — LOW (ref 42–52)
HCT VFR BLDA CALC: 38 % — LOW (ref 39–51)
HGB BLD CALC-MCNC: 12.7 G/DL — SIGNIFICANT CHANGE UP (ref 12.6–17.4)
HGB BLD-MCNC: 12.8 G/DL — LOW (ref 14–18)
IMM GRANULOCYTES NFR BLD AUTO: 0.3 % — SIGNIFICANT CHANGE UP (ref 0.1–0.3)
LACTATE BLDV-MCNC: 1.5 MMOL/L — SIGNIFICANT CHANGE UP (ref 0.5–2)
LYMPHOCYTES # BLD AUTO: 1.62 K/UL — SIGNIFICANT CHANGE UP (ref 1.2–3.4)
LYMPHOCYTES # BLD AUTO: 12.5 % — LOW (ref 20.5–51.1)
MAGNESIUM SERPL-MCNC: 2.3 MG/DL — SIGNIFICANT CHANGE UP (ref 1.8–2.4)
MCHC RBC-ENTMCNC: 32.2 PG — HIGH (ref 27–31)
MCHC RBC-ENTMCNC: 34.2 G/DL — SIGNIFICANT CHANGE UP (ref 32–37)
MCV RBC AUTO: 94.2 FL — HIGH (ref 80–94)
MONOCYTES # BLD AUTO: 0.8 K/UL — HIGH (ref 0.1–0.6)
MONOCYTES NFR BLD AUTO: 6.2 % — SIGNIFICANT CHANGE UP (ref 1.7–9.3)
NEUTROPHILS # BLD AUTO: 10.32 K/UL — HIGH (ref 1.4–6.5)
NEUTROPHILS NFR BLD AUTO: 80 % — HIGH (ref 42.2–75.2)
NRBC # BLD: 0 /100 WBCS — SIGNIFICANT CHANGE UP (ref 0–0)
PCO2 BLDV: 48 MMHG — SIGNIFICANT CHANGE UP (ref 42–55)
PH BLDV: 7.33 — SIGNIFICANT CHANGE UP (ref 7.32–7.43)
PLATELET # BLD AUTO: 227 K/UL — SIGNIFICANT CHANGE UP (ref 130–400)
PMV BLD: 11.8 FL — HIGH (ref 7.4–10.4)
PO2 BLDV: 26 MMHG — SIGNIFICANT CHANGE UP (ref 25–45)
POTASSIUM BLDV-SCNC: 4.8 MMOL/L — SIGNIFICANT CHANGE UP (ref 3.5–5.1)
POTASSIUM SERPL-MCNC: 5.2 MMOL/L — HIGH (ref 3.5–5)
POTASSIUM SERPL-SCNC: 5.2 MMOL/L — HIGH (ref 3.5–5)
PROT SERPL-MCNC: 6.2 G/DL — SIGNIFICANT CHANGE UP (ref 6–8)
RBC # BLD: 3.97 M/UL — LOW (ref 4.7–6.1)
RBC # FLD: 12.6 % — SIGNIFICANT CHANGE UP (ref 11.5–14.5)
SAO2 % BLDV: 35.9 % — LOW (ref 67–88)
SODIUM SERPL-SCNC: 140 MMOL/L — SIGNIFICANT CHANGE UP (ref 135–146)
TROPONIN T, HIGH SENSITIVITY RESULT: 41 NG/L — HIGH (ref 6–21)
TROPONIN T, HIGH SENSITIVITY RESULT: 43 NG/L — HIGH (ref 6–21)
WBC # BLD: 12.91 K/UL — HIGH (ref 4.8–10.8)
WBC # FLD AUTO: 12.91 K/UL — HIGH (ref 4.8–10.8)

## 2024-06-22 PROCEDURE — 80053 COMPREHEN METABOLIC PANEL: CPT

## 2024-06-22 PROCEDURE — 93010 ELECTROCARDIOGRAM REPORT: CPT | Mod: 76

## 2024-06-22 PROCEDURE — 83935 ASSAY OF URINE OSMOLALITY: CPT

## 2024-06-22 PROCEDURE — 80354 DRUG SCREENING FENTANYL: CPT

## 2024-06-22 PROCEDURE — 76770 US EXAM ABDO BACK WALL COMP: CPT

## 2024-06-22 PROCEDURE — 84100 ASSAY OF PHOSPHORUS: CPT

## 2024-06-22 PROCEDURE — 95819 EEG AWAKE AND ASLEEP: CPT

## 2024-06-22 PROCEDURE — 72125 CT NECK SPINE W/O DYE: CPT | Mod: 26,MC

## 2024-06-22 PROCEDURE — 83735 ASSAY OF MAGNESIUM: CPT

## 2024-06-22 PROCEDURE — G0378: CPT

## 2024-06-22 PROCEDURE — 71045 X-RAY EXAM CHEST 1 VIEW: CPT | Mod: 26

## 2024-06-22 PROCEDURE — 93306 TTE W/DOPPLER COMPLETE: CPT

## 2024-06-22 PROCEDURE — 97161 PT EVAL LOW COMPLEX 20 MIN: CPT | Mod: GP

## 2024-06-22 PROCEDURE — 84540 ASSAY OF URINE/UREA-N: CPT

## 2024-06-22 PROCEDURE — 80076 HEPATIC FUNCTION PANEL: CPT

## 2024-06-22 PROCEDURE — 84156 ASSAY OF PROTEIN URINE: CPT

## 2024-06-22 PROCEDURE — 80048 BASIC METABOLIC PNL TOTAL CA: CPT

## 2024-06-22 PROCEDURE — 84133 ASSAY OF URINE POTASSIUM: CPT

## 2024-06-22 PROCEDURE — 84300 ASSAY OF URINE SODIUM: CPT

## 2024-06-22 PROCEDURE — 70450 CT HEAD/BRAIN W/O DYE: CPT | Mod: 26,MC

## 2024-06-22 PROCEDURE — 36415 COLL VENOUS BLD VENIPUNCTURE: CPT

## 2024-06-22 PROCEDURE — 99291 CRITICAL CARE FIRST HOUR: CPT

## 2024-06-22 PROCEDURE — 80307 DRUG TEST PRSMV CHEM ANLYZR: CPT

## 2024-06-22 PROCEDURE — 81001 URINALYSIS AUTO W/SCOPE: CPT

## 2024-06-22 PROCEDURE — 99221 1ST HOSP IP/OBS SF/LOW 40: CPT | Mod: GC

## 2024-06-22 PROCEDURE — 84484 ASSAY OF TROPONIN QUANT: CPT

## 2024-06-22 PROCEDURE — 99223 1ST HOSP IP/OBS HIGH 75: CPT

## 2024-06-22 PROCEDURE — 82570 ASSAY OF URINE CREATININE: CPT

## 2024-06-22 PROCEDURE — 85025 COMPLETE CBC W/AUTO DIFF WBC: CPT

## 2024-06-22 PROCEDURE — 93005 ELECTROCARDIOGRAM TRACING: CPT

## 2024-06-22 RX ORDER — PYRIDOXINE HCL (VITAMIN B6) 100 MG
100 TABLET ORAL ONCE
Refills: 0 | Status: DISCONTINUED | OUTPATIENT
Start: 2024-06-22 | End: 2024-06-22

## 2024-06-22 RX ORDER — THIAMINE HCL 100 MG
100 TABLET ORAL DAILY
Refills: 0 | Status: DISCONTINUED | OUTPATIENT
Start: 2024-06-23 | End: 2024-06-27

## 2024-06-22 RX ORDER — CHLORDIAZEPOXIDE HYDROCHLORIDE 10 MG/1
25 CAPSULE ORAL ONCE
Refills: 0 | Status: DISCONTINUED | OUTPATIENT
Start: 2024-06-22 | End: 2024-06-22

## 2024-06-22 RX ORDER — DEXTROSE MONOHYDRATE AND SODIUM CHLORIDE 5; .3 G/100ML; G/100ML
1000 INJECTION, SOLUTION INTRAVENOUS
Refills: 0 | Status: DISCONTINUED | OUTPATIENT
Start: 2024-06-22 | End: 2024-06-23

## 2024-06-22 RX ORDER — LORAZEPAM 0.5 MG
2 TABLET ORAL
Refills: 0 | Status: DISCONTINUED | OUTPATIENT
Start: 2024-06-22 | End: 2024-06-27

## 2024-06-22 RX ORDER — PYRIDOXINE HCL (VITAMIN B6) 100 MG
100 TABLET ORAL ONCE
Refills: 0 | Status: COMPLETED | OUTPATIENT
Start: 2024-06-22 | End: 2024-06-22

## 2024-06-22 RX ORDER — BIOTIN/FOLIC AC/VIT BCOMP,C/ZN 3MG-0.8MG
1 TABLET ORAL DAILY
Refills: 0 | Status: DISCONTINUED | OUTPATIENT
Start: 2024-06-22 | End: 2024-06-27

## 2024-06-22 RX ORDER — FOLIC ACID
1 POWDER (GRAM) MISCELLANEOUS DAILY
Refills: 0 | Status: DISCONTINUED | OUTPATIENT
Start: 2024-06-22 | End: 2024-06-27

## 2024-06-22 RX ORDER — THIAMINE HCL 100 MG
100 TABLET ORAL ONCE
Refills: 0 | Status: COMPLETED | OUTPATIENT
Start: 2024-06-22 | End: 2024-06-22

## 2024-06-22 RX ORDER — ACETAMINOPHEN 325 MG
650 TABLET ORAL EVERY 6 HOURS
Refills: 0 | Status: DISCONTINUED | OUTPATIENT
Start: 2024-06-22 | End: 2024-06-27

## 2024-06-22 RX ORDER — HEPARIN SODIUM 50 [USP'U]/ML
5000 INJECTION, SOLUTION INTRAVENOUS EVERY 12 HOURS
Refills: 0 | Status: DISCONTINUED | OUTPATIENT
Start: 2024-06-22 | End: 2024-06-27

## 2024-06-22 RX ORDER — ONDANSETRON HYDROCHLORIDE 2 MG/ML
4 INJECTION INTRAMUSCULAR; INTRAVENOUS EVERY 8 HOURS
Refills: 0 | Status: DISCONTINUED | OUTPATIENT
Start: 2024-06-22 | End: 2024-06-27

## 2024-06-22 RX ADMIN — Medication 100 MILLIGRAM(S): at 20:49

## 2024-06-22 RX ADMIN — CHLORDIAZEPOXIDE HYDROCHLORIDE 25 MILLIGRAM(S): 10 CAPSULE ORAL at 20:49

## 2024-06-22 RX ADMIN — CHLORDIAZEPOXIDE HYDROCHLORIDE 25 MILLIGRAM(S): 10 CAPSULE ORAL at 22:20

## 2024-06-23 PROBLEM — I10 ESSENTIAL (PRIMARY) HYPERTENSION: Chronic | Status: ACTIVE | Noted: 2024-05-20

## 2024-06-23 LAB
ALBUMIN SERPL ELPH-MCNC: 3.8 G/DL — SIGNIFICANT CHANGE UP (ref 3.5–5.2)
ALP SERPL-CCNC: 97 U/L — SIGNIFICANT CHANGE UP (ref 30–115)
ALT FLD-CCNC: 9 U/L — SIGNIFICANT CHANGE UP (ref 0–41)
ANION GAP SERPL CALC-SCNC: 13 MMOL/L — SIGNIFICANT CHANGE UP (ref 7–14)
APPEARANCE UR: CLEAR — SIGNIFICANT CHANGE UP
AST SERPL-CCNC: 20 U/L — SIGNIFICANT CHANGE UP (ref 0–41)
BILIRUB DIRECT SERPL-MCNC: <0.2 MG/DL — SIGNIFICANT CHANGE UP (ref 0–0.3)
BILIRUB INDIRECT FLD-MCNC: >0.1 MG/DL — LOW (ref 0.2–1.2)
BILIRUB SERPL-MCNC: 0.3 MG/DL — SIGNIFICANT CHANGE UP (ref 0.2–1.2)
BILIRUB UR-MCNC: NEGATIVE — SIGNIFICANT CHANGE UP
BUN SERPL-MCNC: 31 MG/DL — HIGH (ref 10–20)
CALCIUM SERPL-MCNC: 8.8 MG/DL — SIGNIFICANT CHANGE UP (ref 8.4–10.5)
CHLORIDE SERPL-SCNC: 101 MMOL/L — SIGNIFICANT CHANGE UP (ref 98–110)
CO2 SERPL-SCNC: 24 MMOL/L — SIGNIFICANT CHANGE UP (ref 17–32)
COLOR SPEC: YELLOW — SIGNIFICANT CHANGE UP
CREAT ?TM UR-MCNC: 118 MG/DL — SIGNIFICANT CHANGE UP
CREAT SERPL-MCNC: 1.8 MG/DL — HIGH (ref 0.7–1.5)
DIFF PNL FLD: NEGATIVE — SIGNIFICANT CHANGE UP
EGFR: 38 ML/MIN/1.73M2 — LOW
GLUCOSE SERPL-MCNC: 125 MG/DL — HIGH (ref 70–99)
GLUCOSE UR QL: NEGATIVE MG/DL — SIGNIFICANT CHANGE UP
KETONES UR-MCNC: NEGATIVE MG/DL — SIGNIFICANT CHANGE UP
LEUKOCYTE ESTERASE UR-ACNC: NEGATIVE — SIGNIFICANT CHANGE UP
MAGNESIUM SERPL-MCNC: 2 MG/DL — SIGNIFICANT CHANGE UP (ref 1.8–2.4)
NITRITE UR-MCNC: NEGATIVE — SIGNIFICANT CHANGE UP
OSMOLALITY UR: 587 MOS/KG — SIGNIFICANT CHANGE UP (ref 50–1200)
PH UR: 6.5 — SIGNIFICANT CHANGE UP (ref 5–8)
PHOSPHATE SERPL-MCNC: 3.1 MG/DL — SIGNIFICANT CHANGE UP (ref 2.1–4.9)
POTASSIUM SERPL-MCNC: 4.3 MMOL/L — SIGNIFICANT CHANGE UP (ref 3.5–5)
POTASSIUM SERPL-SCNC: 4.3 MMOL/L — SIGNIFICANT CHANGE UP (ref 3.5–5)
POTASSIUM UR-SCNC: 41 MMOL/L — SIGNIFICANT CHANGE UP
PROT ?TM UR-MCNC: 31 MG/DLG/24H — SIGNIFICANT CHANGE UP
PROT SERPL-MCNC: 5.9 G/DL — LOW (ref 6–8)
PROT UR-MCNC: 30 MG/DL
PROT/CREAT UR-RTO: 0.3 RATIO — HIGH (ref 0–0.2)
SODIUM SERPL-SCNC: 138 MMOL/L — SIGNIFICANT CHANGE UP (ref 135–146)
SODIUM UR-SCNC: 133 MMOL/L — SIGNIFICANT CHANGE UP
SP GR SPEC: 1.02 — SIGNIFICANT CHANGE UP (ref 1–1.03)
UROBILINOGEN FLD QL: 0.2 MG/DL — SIGNIFICANT CHANGE UP (ref 0.2–1)
UUN UR-MCNC: 604 MG/DL — SIGNIFICANT CHANGE UP

## 2024-06-23 PROCEDURE — 93010 ELECTROCARDIOGRAM REPORT: CPT

## 2024-06-23 PROCEDURE — 99233 SBSQ HOSP IP/OBS HIGH 50: CPT

## 2024-06-23 PROCEDURE — 99223 1ST HOSP IP/OBS HIGH 75: CPT

## 2024-06-23 PROCEDURE — 95816 EEG AWAKE AND DROWSY: CPT | Mod: 26

## 2024-06-23 RX ORDER — DONEPEZIL HYDROCHLORIDE 10 MG/1
1 TABLET, FILM COATED ORAL
Refills: 0 | DISCHARGE

## 2024-06-23 RX ORDER — DEXTROSE MONOHYDRATE AND SODIUM CHLORIDE 5; .3 G/100ML; G/100ML
500 INJECTION, SOLUTION INTRAVENOUS
Refills: 0 | Status: DISCONTINUED | OUTPATIENT
Start: 2024-06-23 | End: 2024-06-23

## 2024-06-23 RX ORDER — DONEPEZIL HYDROCHLORIDE 10 MG/1
5 TABLET, FILM COATED ORAL AT BEDTIME
Refills: 0 | Status: DISCONTINUED | OUTPATIENT
Start: 2024-06-23 | End: 2024-06-27

## 2024-06-23 RX ORDER — MAGNESIUM GLUCONATE 27 MG(500)
400 TABLET ORAL
Refills: 0 | DISCHARGE

## 2024-06-23 RX ORDER — TRAZODONE HYDROCHLORIDE 50 MG/1
1 TABLET, FILM COATED ORAL
Refills: 0 | DISCHARGE

## 2024-06-23 RX ORDER — ATORVASTATIN CALCIUM 20 MG/1
80 TABLET, FILM COATED ORAL AT BEDTIME
Refills: 0 | Status: DISCONTINUED | OUTPATIENT
Start: 2024-06-23 | End: 2024-06-27

## 2024-06-23 RX ORDER — BUSPIRONE HYDROCHLORIDE 10 MG/1
1 TABLET ORAL
Refills: 0 | DISCHARGE

## 2024-06-23 RX ORDER — FOLIC ACID
1 POWDER (GRAM) MISCELLANEOUS
Refills: 0 | DISCHARGE

## 2024-06-23 RX ORDER — METOPROLOL TARTRATE 50 MG
1 TABLET ORAL
Refills: 0 | DISCHARGE

## 2024-06-23 RX ORDER — CITALOPRAM 10 MG/1
1 TABLET, FILM COATED ORAL
Refills: 0 | DISCHARGE

## 2024-06-23 RX ORDER — CITALOPRAM 10 MG/1
20 TABLET, FILM COATED ORAL DAILY
Refills: 0 | Status: DISCONTINUED | OUTPATIENT
Start: 2024-06-23 | End: 2024-06-27

## 2024-06-23 RX ORDER — DIVALPROEX SODIUM 500 MG
1 TABLET, DELAYED RELEASE (ENTERIC COATED) ORAL
Refills: 0 | DISCHARGE

## 2024-06-23 RX ORDER — ROSUVASTATIN CALCIUM 20 MG/1
1 TABLET ORAL
Refills: 0 | DISCHARGE

## 2024-06-23 RX ORDER — IRON, VITAMIN C 250; 100 MG/1; MG/1
1 TABLET ORAL
Refills: 0 | DISCHARGE

## 2024-06-23 RX ORDER — METOPROLOL TARTRATE 50 MG
25 TABLET ORAL
Refills: 0 | Status: DISCONTINUED | OUTPATIENT
Start: 2024-06-23 | End: 2024-06-27

## 2024-06-23 RX ORDER — DIVALPROEX SODIUM 500 MG
250 TABLET, DELAYED RELEASE (ENTERIC COATED) ORAL DAILY
Refills: 0 | Status: DISCONTINUED | OUTPATIENT
Start: 2024-06-23 | End: 2024-06-27

## 2024-06-23 RX ADMIN — HEPARIN SODIUM 5000 UNIT(S): 50 INJECTION, SOLUTION INTRAVENOUS at 05:52

## 2024-06-23 RX ADMIN — HEPARIN SODIUM 5000 UNIT(S): 50 INJECTION, SOLUTION INTRAVENOUS at 17:40

## 2024-06-23 RX ADMIN — Medication 25 MILLIGRAM(S): at 17:40

## 2024-06-23 RX ADMIN — Medication 1 MILLIGRAM(S): at 12:13

## 2024-06-23 RX ADMIN — Medication 30 MILLIGRAM(S): at 15:27

## 2024-06-23 RX ADMIN — Medication 650 MILLIGRAM(S): at 21:34

## 2024-06-23 RX ADMIN — DEXTROSE MONOHYDRATE AND SODIUM CHLORIDE 75 MILLILITER(S): 5; .3 INJECTION, SOLUTION INTRAVENOUS at 00:42

## 2024-06-23 RX ADMIN — Medication 3 MILLIGRAM(S): at 21:33

## 2024-06-23 RX ADMIN — ATORVASTATIN CALCIUM 80 MILLIGRAM(S): 20 TABLET, FILM COATED ORAL at 21:33

## 2024-06-23 RX ADMIN — Medication 100 MILLIGRAM(S): at 12:14

## 2024-06-23 RX ADMIN — DONEPEZIL HYDROCHLORIDE 5 MILLIGRAM(S): 10 TABLET, FILM COATED ORAL at 21:33

## 2024-06-23 RX ADMIN — Medication 1 TABLET(S): at 12:17

## 2024-06-24 LAB
ALBUMIN SERPL ELPH-MCNC: 3.4 G/DL — LOW (ref 3.5–5.2)
ALP SERPL-CCNC: 111 U/L — SIGNIFICANT CHANGE UP (ref 30–115)
ALT FLD-CCNC: 9 U/L — SIGNIFICANT CHANGE UP (ref 0–41)
AMPHET UR-MCNC: NEGATIVE — SIGNIFICANT CHANGE UP
ANION GAP SERPL CALC-SCNC: 13 MMOL/L — SIGNIFICANT CHANGE UP (ref 7–14)
AST SERPL-CCNC: 27 U/L — SIGNIFICANT CHANGE UP (ref 0–41)
BARBITURATES UR SCN-MCNC: NEGATIVE — SIGNIFICANT CHANGE UP
BASOPHILS # BLD AUTO: 0.05 K/UL — SIGNIFICANT CHANGE UP (ref 0–0.2)
BASOPHILS NFR BLD AUTO: 0.6 % — SIGNIFICANT CHANGE UP (ref 0–1)
BENZODIAZ UR-MCNC: NEGATIVE — SIGNIFICANT CHANGE UP
BILIRUB SERPL-MCNC: 0.5 MG/DL — SIGNIFICANT CHANGE UP (ref 0.2–1.2)
BUN SERPL-MCNC: 28 MG/DL — HIGH (ref 10–20)
CALCIUM SERPL-MCNC: 8.6 MG/DL — SIGNIFICANT CHANGE UP (ref 8.4–10.4)
CHLORIDE SERPL-SCNC: 103 MMOL/L — SIGNIFICANT CHANGE UP (ref 98–110)
CO2 SERPL-SCNC: 20 MMOL/L — SIGNIFICANT CHANGE UP (ref 17–32)
COCAINE METAB.OTHER UR-MCNC: NEGATIVE — SIGNIFICANT CHANGE UP
CREAT SERPL-MCNC: 1.3 MG/DL — SIGNIFICANT CHANGE UP (ref 0.7–1.5)
EGFR: 56 ML/MIN/1.73M2 — LOW
EOSINOPHIL # BLD AUTO: 0.26 K/UL — SIGNIFICANT CHANGE UP (ref 0–0.7)
EOSINOPHIL NFR BLD AUTO: 3.2 % — SIGNIFICANT CHANGE UP (ref 0–8)
FENTANYL UR QL: NEGATIVE — SIGNIFICANT CHANGE UP
GLUCOSE SERPL-MCNC: 101 MG/DL — HIGH (ref 70–99)
HCT VFR BLD CALC: 37.3 % — LOW (ref 42–52)
HGB BLD-MCNC: 12.6 G/DL — LOW (ref 14–18)
IMM GRANULOCYTES NFR BLD AUTO: 0.2 % — SIGNIFICANT CHANGE UP (ref 0.1–0.3)
LYMPHOCYTES # BLD AUTO: 2.85 K/UL — SIGNIFICANT CHANGE UP (ref 1.2–3.4)
LYMPHOCYTES # BLD AUTO: 35.3 % — SIGNIFICANT CHANGE UP (ref 20.5–51.1)
MAGNESIUM SERPL-MCNC: 2.2 MG/DL — SIGNIFICANT CHANGE UP (ref 1.8–2.4)
MCHC RBC-ENTMCNC: 31.8 PG — HIGH (ref 27–31)
MCHC RBC-ENTMCNC: 33.8 G/DL — SIGNIFICANT CHANGE UP (ref 32–37)
MCV RBC AUTO: 94.2 FL — HIGH (ref 80–94)
METHADONE UR-MCNC: NEGATIVE — SIGNIFICANT CHANGE UP
MONOCYTES # BLD AUTO: 0.74 K/UL — HIGH (ref 0.1–0.6)
MONOCYTES NFR BLD AUTO: 9.2 % — SIGNIFICANT CHANGE UP (ref 1.7–9.3)
NEUTROPHILS # BLD AUTO: 4.16 K/UL — SIGNIFICANT CHANGE UP (ref 1.4–6.5)
NEUTROPHILS NFR BLD AUTO: 51.5 % — SIGNIFICANT CHANGE UP (ref 42.2–75.2)
NRBC # BLD: 0 /100 WBCS — SIGNIFICANT CHANGE UP (ref 0–0)
OPIATES UR-MCNC: NEGATIVE — SIGNIFICANT CHANGE UP
PCP SPEC-MCNC: SIGNIFICANT CHANGE UP
PLATELET # BLD AUTO: 140 K/UL — SIGNIFICANT CHANGE UP (ref 130–400)
PMV BLD: 12.5 FL — HIGH (ref 7.4–10.4)
POTASSIUM SERPL-MCNC: 4.6 MMOL/L — SIGNIFICANT CHANGE UP (ref 3.5–5)
POTASSIUM SERPL-SCNC: 4.6 MMOL/L — SIGNIFICANT CHANGE UP (ref 3.5–5)
PROPOXYPHENE QUALITATIVE URINE RESULT: NEGATIVE — SIGNIFICANT CHANGE UP
PROT SERPL-MCNC: 6 G/DL — SIGNIFICANT CHANGE UP (ref 6–8)
RBC # BLD: 3.96 M/UL — LOW (ref 4.7–6.1)
RBC # FLD: 12.8 % — SIGNIFICANT CHANGE UP (ref 11.5–14.5)
SODIUM SERPL-SCNC: 136 MMOL/L — SIGNIFICANT CHANGE UP (ref 135–146)
WBC # BLD: 8.08 K/UL — SIGNIFICANT CHANGE UP (ref 4.8–10.8)
WBC # FLD AUTO: 8.08 K/UL — SIGNIFICANT CHANGE UP (ref 4.8–10.8)

## 2024-06-24 PROCEDURE — 99233 SBSQ HOSP IP/OBS HIGH 50: CPT

## 2024-06-24 PROCEDURE — 99222 1ST HOSP IP/OBS MODERATE 55: CPT

## 2024-06-24 PROCEDURE — 76770 US EXAM ABDO BACK WALL COMP: CPT | Mod: 26

## 2024-06-24 RX ORDER — BUSPIRONE HYDROCHLORIDE 10 MG/1
7.5 TABLET ORAL EVERY 12 HOURS
Refills: 0 | Status: DISCONTINUED | OUTPATIENT
Start: 2024-06-24 | End: 2024-06-27

## 2024-06-24 RX ADMIN — Medication 3 MILLIGRAM(S): at 22:14

## 2024-06-24 RX ADMIN — Medication 250 MILLIGRAM(S): at 11:16

## 2024-06-24 RX ADMIN — BUSPIRONE HYDROCHLORIDE 7.5 MILLIGRAM(S): 10 TABLET ORAL at 17:33

## 2024-06-24 RX ADMIN — Medication 2 MILLIGRAM(S): at 22:15

## 2024-06-24 RX ADMIN — Medication 30 MILLIGRAM(S): at 05:52

## 2024-06-24 RX ADMIN — Medication 25 MILLIGRAM(S): at 05:52

## 2024-06-24 RX ADMIN — DONEPEZIL HYDROCHLORIDE 5 MILLIGRAM(S): 10 TABLET, FILM COATED ORAL at 22:14

## 2024-06-24 RX ADMIN — Medication 1 MILLIGRAM(S): at 11:16

## 2024-06-24 RX ADMIN — HEPARIN SODIUM 5000 UNIT(S): 50 INJECTION, SOLUTION INTRAVENOUS at 05:53

## 2024-06-24 RX ADMIN — Medication 100 MILLIGRAM(S): at 11:16

## 2024-06-24 RX ADMIN — Medication 1 TABLET(S): at 11:16

## 2024-06-24 RX ADMIN — CITALOPRAM 20 MILLIGRAM(S): 10 TABLET, FILM COATED ORAL at 11:16

## 2024-06-24 RX ADMIN — ATORVASTATIN CALCIUM 80 MILLIGRAM(S): 20 TABLET, FILM COATED ORAL at 22:14

## 2024-06-24 RX ADMIN — Medication 30 MILLIGRAM(S): at 11:40

## 2024-06-24 RX ADMIN — Medication 25 MILLIGRAM(S): at 17:33

## 2024-06-25 LAB
ANION GAP SERPL CALC-SCNC: 12 MMOL/L — SIGNIFICANT CHANGE UP (ref 7–14)
BASOPHILS # BLD AUTO: 0.05 K/UL — SIGNIFICANT CHANGE UP (ref 0–0.2)
BASOPHILS NFR BLD AUTO: 0.6 % — SIGNIFICANT CHANGE UP (ref 0–1)
BUN SERPL-MCNC: 30 MG/DL — HIGH (ref 10–20)
CALCIUM SERPL-MCNC: 9.1 MG/DL — SIGNIFICANT CHANGE UP (ref 8.4–10.4)
CHLORIDE SERPL-SCNC: 101 MMOL/L — SIGNIFICANT CHANGE UP (ref 98–110)
CO2 SERPL-SCNC: 24 MMOL/L — SIGNIFICANT CHANGE UP (ref 17–32)
CREAT SERPL-MCNC: 1.3 MG/DL — SIGNIFICANT CHANGE UP (ref 0.7–1.5)
EGFR: 56 ML/MIN/1.73M2 — LOW
EOSINOPHIL # BLD AUTO: 0.24 K/UL — SIGNIFICANT CHANGE UP (ref 0–0.7)
EOSINOPHIL NFR BLD AUTO: 2.9 % — SIGNIFICANT CHANGE UP (ref 0–8)
GLUCOSE SERPL-MCNC: 91 MG/DL — SIGNIFICANT CHANGE UP (ref 70–99)
HCT VFR BLD CALC: 37.9 % — LOW (ref 42–52)
HGB BLD-MCNC: 12.8 G/DL — LOW (ref 14–18)
IMM GRANULOCYTES NFR BLD AUTO: 0.2 % — SIGNIFICANT CHANGE UP (ref 0.1–0.3)
LYMPHOCYTES # BLD AUTO: 2.65 K/UL — SIGNIFICANT CHANGE UP (ref 1.2–3.4)
LYMPHOCYTES # BLD AUTO: 31.8 % — SIGNIFICANT CHANGE UP (ref 20.5–51.1)
MAGNESIUM SERPL-MCNC: 2 MG/DL — SIGNIFICANT CHANGE UP (ref 1.8–2.4)
MCHC RBC-ENTMCNC: 31.8 PG — HIGH (ref 27–31)
MCHC RBC-ENTMCNC: 33.8 G/DL — SIGNIFICANT CHANGE UP (ref 32–37)
MCV RBC AUTO: 94 FL — SIGNIFICANT CHANGE UP (ref 80–94)
MONOCYTES # BLD AUTO: 0.83 K/UL — HIGH (ref 0.1–0.6)
MONOCYTES NFR BLD AUTO: 10 % — HIGH (ref 1.7–9.3)
NEUTROPHILS # BLD AUTO: 4.55 K/UL — SIGNIFICANT CHANGE UP (ref 1.4–6.5)
NEUTROPHILS NFR BLD AUTO: 54.5 % — SIGNIFICANT CHANGE UP (ref 42.2–75.2)
NRBC # BLD: 0 /100 WBCS — SIGNIFICANT CHANGE UP (ref 0–0)
PLATELET # BLD AUTO: 221 K/UL — SIGNIFICANT CHANGE UP (ref 130–400)
PMV BLD: 11.8 FL — HIGH (ref 7.4–10.4)
POTASSIUM SERPL-MCNC: 4.3 MMOL/L — SIGNIFICANT CHANGE UP (ref 3.5–5)
POTASSIUM SERPL-SCNC: 4.3 MMOL/L — SIGNIFICANT CHANGE UP (ref 3.5–5)
RBC # BLD: 4.03 M/UL — LOW (ref 4.7–6.1)
RBC # FLD: 12.5 % — SIGNIFICANT CHANGE UP (ref 11.5–14.5)
SODIUM SERPL-SCNC: 137 MMOL/L — SIGNIFICANT CHANGE UP (ref 135–146)
WBC # BLD: 8.34 K/UL — SIGNIFICANT CHANGE UP (ref 4.8–10.8)
WBC # FLD AUTO: 8.34 K/UL — SIGNIFICANT CHANGE UP (ref 4.8–10.8)

## 2024-06-25 PROCEDURE — 99233 SBSQ HOSP IP/OBS HIGH 50: CPT

## 2024-06-25 RX ORDER — TRAZODONE HYDROCHLORIDE 50 MG/1
50 TABLET, FILM COATED ORAL AT BEDTIME
Refills: 0 | Status: DISCONTINUED | OUTPATIENT
Start: 2024-06-25 | End: 2024-06-27

## 2024-06-25 RX ADMIN — Medication 60 MILLIGRAM(S): at 06:18

## 2024-06-25 RX ADMIN — TRAZODONE HYDROCHLORIDE 50 MILLIGRAM(S): 50 TABLET, FILM COATED ORAL at 21:25

## 2024-06-25 RX ADMIN — Medication 1 TABLET(S): at 11:28

## 2024-06-25 RX ADMIN — Medication 25 MILLIGRAM(S): at 17:16

## 2024-06-25 RX ADMIN — HEPARIN SODIUM 5000 UNIT(S): 50 INJECTION, SOLUTION INTRAVENOUS at 06:18

## 2024-06-25 RX ADMIN — BUSPIRONE HYDROCHLORIDE 7.5 MILLIGRAM(S): 10 TABLET ORAL at 06:18

## 2024-06-25 RX ADMIN — Medication 1 MILLIGRAM(S): at 11:28

## 2024-06-25 RX ADMIN — CITALOPRAM 20 MILLIGRAM(S): 10 TABLET, FILM COATED ORAL at 11:28

## 2024-06-25 RX ADMIN — DONEPEZIL HYDROCHLORIDE 5 MILLIGRAM(S): 10 TABLET, FILM COATED ORAL at 21:25

## 2024-06-25 RX ADMIN — HEPARIN SODIUM 5000 UNIT(S): 50 INJECTION, SOLUTION INTRAVENOUS at 17:16

## 2024-06-25 RX ADMIN — BUSPIRONE HYDROCHLORIDE 7.5 MILLIGRAM(S): 10 TABLET ORAL at 17:15

## 2024-06-25 RX ADMIN — ATORVASTATIN CALCIUM 80 MILLIGRAM(S): 20 TABLET, FILM COATED ORAL at 21:25

## 2024-06-25 RX ADMIN — Medication 250 MILLIGRAM(S): at 11:28

## 2024-06-25 RX ADMIN — Medication 100 MILLIGRAM(S): at 11:28

## 2024-06-25 RX ADMIN — Medication 25 MILLIGRAM(S): at 09:11

## 2024-06-26 ENCOUNTER — TRANSCRIPTION ENCOUNTER (OUTPATIENT)
Age: 79
End: 2024-06-26

## 2024-06-26 ENCOUNTER — RESULT REVIEW (OUTPATIENT)
Age: 79
End: 2024-06-26

## 2024-06-26 LAB
ANION GAP SERPL CALC-SCNC: 11 MMOL/L — SIGNIFICANT CHANGE UP (ref 7–14)
BUN SERPL-MCNC: 31 MG/DL — HIGH (ref 10–20)
CALCIUM SERPL-MCNC: 8.6 MG/DL — SIGNIFICANT CHANGE UP (ref 8.4–10.4)
CHLORIDE SERPL-SCNC: 99 MMOL/L — SIGNIFICANT CHANGE UP (ref 98–110)
CO2 SERPL-SCNC: 23 MMOL/L — SIGNIFICANT CHANGE UP (ref 17–32)
CREAT SERPL-MCNC: 1.5 MG/DL — SIGNIFICANT CHANGE UP (ref 0.7–1.5)
EGFR: 47 ML/MIN/1.73M2 — LOW
GLUCOSE SERPL-MCNC: 86 MG/DL — SIGNIFICANT CHANGE UP (ref 70–99)
MAGNESIUM SERPL-MCNC: 2 MG/DL — SIGNIFICANT CHANGE UP (ref 1.8–2.4)
POTASSIUM SERPL-MCNC: 4 MMOL/L — SIGNIFICANT CHANGE UP (ref 3.5–5)
POTASSIUM SERPL-SCNC: 4 MMOL/L — SIGNIFICANT CHANGE UP (ref 3.5–5)
SODIUM SERPL-SCNC: 133 MMOL/L — LOW (ref 135–146)

## 2024-06-26 PROCEDURE — 93306 TTE W/DOPPLER COMPLETE: CPT | Mod: 26

## 2024-06-26 PROCEDURE — 99232 SBSQ HOSP IP/OBS MODERATE 35: CPT

## 2024-06-26 RX ADMIN — ATORVASTATIN CALCIUM 80 MILLIGRAM(S): 20 TABLET, FILM COATED ORAL at 22:44

## 2024-06-26 RX ADMIN — Medication 25 MILLIGRAM(S): at 17:28

## 2024-06-26 RX ADMIN — Medication 1 TABLET(S): at 12:13

## 2024-06-26 RX ADMIN — Medication 60 MILLIGRAM(S): at 05:50

## 2024-06-26 RX ADMIN — Medication 25 MILLIGRAM(S): at 05:50

## 2024-06-26 RX ADMIN — BUSPIRONE HYDROCHLORIDE 7.5 MILLIGRAM(S): 10 TABLET ORAL at 17:27

## 2024-06-26 RX ADMIN — DONEPEZIL HYDROCHLORIDE 5 MILLIGRAM(S): 10 TABLET, FILM COATED ORAL at 22:43

## 2024-06-26 RX ADMIN — Medication 250 MILLIGRAM(S): at 12:13

## 2024-06-26 RX ADMIN — TRAZODONE HYDROCHLORIDE 50 MILLIGRAM(S): 50 TABLET, FILM COATED ORAL at 22:44

## 2024-06-26 RX ADMIN — Medication 1 MILLIGRAM(S): at 12:13

## 2024-06-26 RX ADMIN — BUSPIRONE HYDROCHLORIDE 7.5 MILLIGRAM(S): 10 TABLET ORAL at 05:50

## 2024-06-26 RX ADMIN — Medication 100 MILLIGRAM(S): at 12:13

## 2024-06-26 RX ADMIN — CITALOPRAM 20 MILLIGRAM(S): 10 TABLET, FILM COATED ORAL at 12:13

## 2024-06-27 ENCOUNTER — NON-APPOINTMENT (OUTPATIENT)
Age: 79
End: 2024-06-27

## 2024-06-27 VITALS
TEMPERATURE: 98 F | OXYGEN SATURATION: 98 % | HEART RATE: 70 BPM | RESPIRATION RATE: 18 BRPM | SYSTOLIC BLOOD PRESSURE: 114 MMHG | DIASTOLIC BLOOD PRESSURE: 66 MMHG

## 2024-06-27 PROCEDURE — 99239 HOSP IP/OBS DSCHRG MGMT >30: CPT

## 2024-06-27 RX ADMIN — BUSPIRONE HYDROCHLORIDE 7.5 MILLIGRAM(S): 10 TABLET ORAL at 05:22

## 2024-06-27 RX ADMIN — HEPARIN SODIUM 5000 UNIT(S): 50 INJECTION, SOLUTION INTRAVENOUS at 05:22

## 2024-06-27 RX ADMIN — Medication 25 MILLIGRAM(S): at 05:23

## 2024-06-27 RX ADMIN — Medication 250 MILLIGRAM(S): at 12:32

## 2024-06-27 RX ADMIN — CITALOPRAM 20 MILLIGRAM(S): 10 TABLET, FILM COATED ORAL at 12:31

## 2024-06-27 RX ADMIN — Medication 100 MILLIGRAM(S): at 12:32

## 2024-06-27 RX ADMIN — Medication 1 MILLIGRAM(S): at 12:32

## 2024-06-27 RX ADMIN — Medication 1 TABLET(S): at 12:31

## 2024-06-27 RX ADMIN — Medication 60 MILLIGRAM(S): at 05:22

## 2024-07-04 DIAGNOSIS — I12.9 HYPERTENSIVE CHRONIC KIDNEY DISEASE WITH STAGE 1 THROUGH STAGE 4 CHRONIC KIDNEY DISEASE, OR UNSPECIFIED CHRONIC KIDNEY DISEASE: ICD-10-CM

## 2024-07-04 DIAGNOSIS — F10.139 ALCOHOL ABUSE WITH WITHDRAWAL, UNSPECIFIED: ICD-10-CM

## 2024-07-04 DIAGNOSIS — Z91.199 PATIENT'S NONCOMPLIANCE WITH OTHER MEDICAL TREATMENT AND REGIMEN DUE TO UNSPECIFIED REASON: ICD-10-CM

## 2024-07-04 DIAGNOSIS — I47.10 SUPRAVENTRICULAR TACHYCARDIA, UNSPECIFIED: ICD-10-CM

## 2024-07-04 DIAGNOSIS — E86.0 DEHYDRATION: ICD-10-CM

## 2024-07-04 DIAGNOSIS — G40.409 OTHER GENERALIZED EPILEPSY AND EPILEPTIC SYNDROMES, NOT INTRACTABLE, WITHOUT STATUS EPILEPTICUS: ICD-10-CM

## 2024-07-04 DIAGNOSIS — N17.9 ACUTE KIDNEY FAILURE, UNSPECIFIED: ICD-10-CM

## 2024-07-04 DIAGNOSIS — N18.30 CHRONIC KIDNEY DISEASE, STAGE 3 UNSPECIFIED: ICD-10-CM

## 2024-07-04 DIAGNOSIS — E87.5 HYPERKALEMIA: ICD-10-CM

## 2024-11-14 ENCOUNTER — EMERGENCY (EMERGENCY)
Facility: HOSPITAL | Age: 79
LOS: 0 days | Discharge: HOME | End: 2024-11-14
Attending: STUDENT IN AN ORGANIZED HEALTH CARE EDUCATION/TRAINING PROGRAM
Payer: MEDICARE

## 2024-11-14 VITALS
SYSTOLIC BLOOD PRESSURE: 130 MMHG | RESPIRATION RATE: 18 BRPM | OXYGEN SATURATION: 98 % | WEIGHT: 134.92 LBS | TEMPERATURE: 98 F | HEART RATE: 70 BPM | DIASTOLIC BLOOD PRESSURE: 64 MMHG

## 2024-11-14 VITALS
OXYGEN SATURATION: 97 % | SYSTOLIC BLOOD PRESSURE: 149 MMHG | DIASTOLIC BLOOD PRESSURE: 78 MMHG | RESPIRATION RATE: 18 BRPM | TEMPERATURE: 98 F | HEART RATE: 77 BPM

## 2024-11-14 DIAGNOSIS — R41.0 DISORIENTATION, UNSPECIFIED: ICD-10-CM

## 2024-11-14 DIAGNOSIS — I10 ESSENTIAL (PRIMARY) HYPERTENSION: ICD-10-CM

## 2024-11-14 DIAGNOSIS — R29.898 OTHER SYMPTOMS AND SIGNS INVOLVING THE MUSCULOSKELETAL SYSTEM: ICD-10-CM

## 2024-11-14 DIAGNOSIS — R53.1 WEAKNESS: ICD-10-CM

## 2024-11-14 DIAGNOSIS — R42 DIZZINESS AND GIDDINESS: ICD-10-CM

## 2024-11-14 DIAGNOSIS — F10.10 ALCOHOL ABUSE, UNCOMPLICATED: ICD-10-CM

## 2024-11-14 LAB
ALBUMIN SERPL ELPH-MCNC: 4 G/DL — SIGNIFICANT CHANGE UP (ref 3.5–5.2)
ALP SERPL-CCNC: 96 U/L — SIGNIFICANT CHANGE UP (ref 30–115)
ALT FLD-CCNC: 21 U/L — SIGNIFICANT CHANGE UP (ref 0–41)
ANION GAP SERPL CALC-SCNC: 9 MMOL/L — SIGNIFICANT CHANGE UP (ref 7–14)
AST SERPL-CCNC: 46 U/L — HIGH (ref 0–41)
BASOPHILS # BLD AUTO: 0.04 K/UL — SIGNIFICANT CHANGE UP (ref 0–0.2)
BASOPHILS NFR BLD AUTO: 0.4 % — SIGNIFICANT CHANGE UP (ref 0–1)
BILIRUB SERPL-MCNC: 0.4 MG/DL — SIGNIFICANT CHANGE UP (ref 0.2–1.2)
BUN SERPL-MCNC: 33 MG/DL — HIGH (ref 10–20)
CALCIUM SERPL-MCNC: 9 MG/DL — SIGNIFICANT CHANGE UP (ref 8.4–10.5)
CHLORIDE SERPL-SCNC: 101 MMOL/L — SIGNIFICANT CHANGE UP (ref 98–110)
CO2 SERPL-SCNC: 26 MMOL/L — SIGNIFICANT CHANGE UP (ref 17–32)
CREAT SERPL-MCNC: 1.4 MG/DL — SIGNIFICANT CHANGE UP (ref 0.7–1.5)
EGFR: 51 ML/MIN/1.73M2 — LOW
EOSINOPHIL # BLD AUTO: 0.06 K/UL — SIGNIFICANT CHANGE UP (ref 0–0.7)
EOSINOPHIL NFR BLD AUTO: 0.7 % — SIGNIFICANT CHANGE UP (ref 0–8)
GLUCOSE SERPL-MCNC: 122 MG/DL — HIGH (ref 70–99)
HCT VFR BLD CALC: 33.9 % — LOW (ref 42–52)
HGB BLD-MCNC: 11.1 G/DL — LOW (ref 14–18)
IMM GRANULOCYTES NFR BLD AUTO: 0.3 % — SIGNIFICANT CHANGE UP (ref 0.1–0.3)
LYMPHOCYTES # BLD AUTO: 1.4 K/UL — SIGNIFICANT CHANGE UP (ref 1.2–3.4)
LYMPHOCYTES # BLD AUTO: 15.6 % — LOW (ref 20.5–51.1)
MAGNESIUM SERPL-MCNC: 2.4 MG/DL — SIGNIFICANT CHANGE UP (ref 1.8–2.4)
MCHC RBC-ENTMCNC: 31.9 PG — HIGH (ref 27–31)
MCHC RBC-ENTMCNC: 32.7 G/DL — SIGNIFICANT CHANGE UP (ref 32–37)
MCV RBC AUTO: 97.4 FL — HIGH (ref 80–94)
MONOCYTES # BLD AUTO: 0.73 K/UL — HIGH (ref 0.1–0.6)
MONOCYTES NFR BLD AUTO: 8.2 % — SIGNIFICANT CHANGE UP (ref 1.7–9.3)
NEUTROPHILS # BLD AUTO: 6.69 K/UL — HIGH (ref 1.4–6.5)
NEUTROPHILS NFR BLD AUTO: 74.8 % — SIGNIFICANT CHANGE UP (ref 42.2–75.2)
NRBC # BLD: 0 /100 WBCS — SIGNIFICANT CHANGE UP (ref 0–0)
PLATELET # BLD AUTO: 226 K/UL — SIGNIFICANT CHANGE UP (ref 130–400)
PMV BLD: 11.2 FL — HIGH (ref 7.4–10.4)
POTASSIUM SERPL-MCNC: 4.2 MMOL/L — SIGNIFICANT CHANGE UP (ref 3.5–5)
POTASSIUM SERPL-SCNC: 4.2 MMOL/L — SIGNIFICANT CHANGE UP (ref 3.5–5)
PROT SERPL-MCNC: 6.1 G/DL — SIGNIFICANT CHANGE UP (ref 6–8)
RBC # BLD: 3.48 M/UL — LOW (ref 4.7–6.1)
RBC # FLD: 13.2 % — SIGNIFICANT CHANGE UP (ref 11.5–14.5)
SODIUM SERPL-SCNC: 136 MMOL/L — SIGNIFICANT CHANGE UP (ref 135–146)
WBC # BLD: 8.95 K/UL — SIGNIFICANT CHANGE UP (ref 4.8–10.8)
WBC # FLD AUTO: 8.95 K/UL — SIGNIFICANT CHANGE UP (ref 4.8–10.8)

## 2024-11-14 PROCEDURE — 99285 EMERGENCY DEPT VISIT HI MDM: CPT | Mod: 25

## 2024-11-14 PROCEDURE — 36000 PLACE NEEDLE IN VEIN: CPT

## 2024-11-14 PROCEDURE — 70450 CT HEAD/BRAIN W/O DYE: CPT | Mod: 26,MC

## 2024-11-14 PROCEDURE — 71045 X-RAY EXAM CHEST 1 VIEW: CPT | Mod: 26

## 2024-11-14 PROCEDURE — 80053 COMPREHEN METABOLIC PANEL: CPT

## 2024-11-14 PROCEDURE — 70450 CT HEAD/BRAIN W/O DYE: CPT | Mod: MC

## 2024-11-14 PROCEDURE — 93010 ELECTROCARDIOGRAM REPORT: CPT

## 2024-11-14 PROCEDURE — 71045 X-RAY EXAM CHEST 1 VIEW: CPT

## 2024-11-14 PROCEDURE — 85025 COMPLETE CBC W/AUTO DIFF WBC: CPT

## 2024-11-14 PROCEDURE — 36415 COLL VENOUS BLD VENIPUNCTURE: CPT

## 2024-11-14 PROCEDURE — 83735 ASSAY OF MAGNESIUM: CPT

## 2024-11-14 PROCEDURE — 93005 ELECTROCARDIOGRAM TRACING: CPT

## 2024-11-14 PROCEDURE — 99285 EMERGENCY DEPT VISIT HI MDM: CPT

## 2024-11-14 RX ORDER — SODIUM CHLORIDE 9 MG/ML
1000 INJECTION, SOLUTION INTRAMUSCULAR; INTRAVENOUS; SUBCUTANEOUS ONCE
Refills: 0 | Status: COMPLETED | OUTPATIENT
Start: 2024-11-14 | End: 2024-11-14

## 2024-11-14 RX ADMIN — SODIUM CHLORIDE 1000 MILLILITER(S): 9 INJECTION, SOLUTION INTRAMUSCULAR; INTRAVENOUS; SUBCUTANEOUS at 13:14

## 2024-11-14 NOTE — ED ADULT TRIAGE NOTE - CHIEF COMPLAINT QUOTE
BIBA from [facility] for increased confusion and fro low PO intake x 2 days . Patient have baseline dementia . As per staff ,patient had a near syncopal episode today denied any witnessed injury

## 2024-11-14 NOTE — ED ADULT TRIAGE NOTE - ESI TRIAGE ACUITY LEVEL, MLM
Patient called stating he was needing to speak with Sarita in reference to his [place] papers. Patient did not want to leave any other information.    Please call @ [phone]   2

## 2024-11-14 NOTE — ED PROVIDER NOTE - PHYSICAL EXAMINATION
CONST: Well appearing in NAD  EYES: PERRL, EOMI, Sclera and conjunctiva clear.   ENT: Oropharynx normal appearing, no erythema or exudates. Uvula midline.  CARD: Normal S1 S2; Normal rate and rhythm  RESP: Equal BS B/L, No wheezes, rhonchi or rales. No distress  GI: Soft, non-tender, non-distended.  MS: Normal ROM in all extremities. No midline spinal tenderness.  SKIN: Warm, dry, no acute rashes.   NEURO: A&Ox3, No focal deficits. Strength 5/5 with no sensory deficits.

## 2024-11-14 NOTE — ED ADULT NURSE REASSESSMENT NOTE - NS ED NURSE REASSESS COMMENT FT1
Pt states he is feeling better after eating lunch. VS stable. Orthostatics done, WNL.   Pt denies any complaints at this time. Ambulated in room steadily with minimal assist.  CT head results neg. Lab results pending.

## 2024-11-14 NOTE — ED PROVIDER NOTE - OBJECTIVE STATEMENT
79-year-old male past medical history of hypertension, dementia presents to the ED for evaluation.  Patient sent in from nursing home after he had 2 episodes where his legs gave out while walking with aid.  Per [facility] at [facility] patient had normal vital signs after both episodes but seemed more confused than usual so they sent him to the ED for further evaluation.  Denies any chest pain, shortness of breath, headache, nausea, vomiting, abdominal pain

## 2024-11-14 NOTE — ED ADULT NURSE NOTE - CHIEF COMPLAINT QUOTE
BIBA from [facility] for increased confusion and fro low PO intake x 2 days . Patient have baseline dementia . As per staff ,patient had a near syncopal episode today denied any witnessed injury

## 2024-11-14 NOTE — ED PROVIDER NOTE - CLINICAL SUMMARY MEDICAL DECISION MAKING FREE TEXT BOX
79-year-old male, past medical history of dementia, hypertension, alcohol abuse, presenting for lightheadedness.  He states that he was standing when he suddenly felt lightheaded and lowered himself to the ground.  Currently asymptomatic and does not denies of anything.  Denies head trauma, chest pain, shortness of breath, nausea, vomiting, abdominal pain.  [facility] was walking with any when his legs seem to weak and gave out.  Never fully fell to the ground.    Elderly male on exam.  In no acute distress.  Vitals reviewed and within acceptable limits.  Heart RRR.  Lungs CTAB.  Abdomen soft nondistended nontender.  Moves all extremities.  No external signs of trauma. 79-year-old male, past medical history of dementia, hypertension, alcohol abuse, presenting for lightheadedness.  He states that he was standing when he suddenly felt lightheaded and lowered himself to the ground.  Currently asymptomatic and does not denies of anything.  Denies head trauma, chest pain, shortness of breath, nausea, vomiting, abdominal pain.  Discussed with [facility] staff who states he was walking with any when his legs seem to weak and gave out.  Never fully fell to the ground.    Elderly male on exam.  In no acute distress.  Vitals reviewed and within acceptable limits.  Heart RRR.  Lungs CTAB.  Abdomen soft nondistended nontender.  Moves all extremities.  No external signs of trauma.    Labs and EKG were ordered and reviewed.  Imaging was ordered and reviewed by me.  Patient's records (prior hospital, ED visit, and/or nursing home notes if available) were reviewed.  Additional history was obtained from [facility].  Escalation to admission/observation was considered.  Patient tolerated PO.  Has no complaints at this time.  Orthostatics negative.  Ambulated independently without any issue or assistance.  Patient would like to be discharged back to residence.  Stable for discharge.

## 2024-11-14 NOTE — ED ADULT NURSE NOTE - OBJECTIVE STATEMENT
Pt sent from [facility] for knees buckling while he was ambulating today then requested to go to ED

## 2024-11-14 NOTE — ED PROVIDER NOTE - PROGRESS NOTE DETAILS
JS: orthostatics negative, patient tolerating PO, ambulated in ED, Work up without significant acute pathology will dc back to NH.

## 2024-11-14 NOTE — ED PROVIDER NOTE - PATIENT PORTAL LINK FT
You can access the FollowMyHealth Patient Portal offered by [facility] by registering at the following website: [email or web address]. By joining [employer]’s FollowMyHealth portal, you will also be able to view your health information using other applications (apps) compatible with our system.

## 2024-12-01 ENCOUNTER — INPATIENT (INPATIENT)
Facility: HOSPITAL | Age: 79
LOS: 2 days | Discharge: HOME | DRG: 690 | End: 2024-12-04
Attending: INTERNAL MEDICINE | Admitting: INTERNAL MEDICINE
Payer: MEDICARE

## 2024-12-01 VITALS
OXYGEN SATURATION: 95 % | RESPIRATION RATE: 18 BRPM | TEMPERATURE: 97 F | WEIGHT: 175.05 LBS | DIASTOLIC BLOOD PRESSURE: 70 MMHG | SYSTOLIC BLOOD PRESSURE: 154 MMHG | HEART RATE: 84 BPM

## 2024-12-01 DIAGNOSIS — D64.9 ANEMIA, UNSPECIFIED: ICD-10-CM

## 2024-12-01 DIAGNOSIS — F10.11 ALCOHOL ABUSE, IN REMISSION: ICD-10-CM

## 2024-12-01 DIAGNOSIS — F12.90 CANNABIS USE, UNSPECIFIED, UNCOMPLICATED: ICD-10-CM

## 2024-12-01 DIAGNOSIS — Z91.81 HISTORY OF FALLING: ICD-10-CM

## 2024-12-01 DIAGNOSIS — Z87.891 PERSONAL HISTORY OF NICOTINE DEPENDENCE: ICD-10-CM

## 2024-12-01 DIAGNOSIS — I50.32 CHRONIC DIASTOLIC (CONGESTIVE) HEART FAILURE: ICD-10-CM

## 2024-12-01 DIAGNOSIS — A41.9 SEPSIS, UNSPECIFIED ORGANISM: ICD-10-CM

## 2024-12-01 DIAGNOSIS — F03.94 UNSPECIFIED DEMENTIA, UNSPECIFIED SEVERITY, WITH ANXIETY: ICD-10-CM

## 2024-12-01 DIAGNOSIS — R56.9 UNSPECIFIED CONVULSIONS: ICD-10-CM

## 2024-12-01 DIAGNOSIS — E78.5 HYPERLIPIDEMIA, UNSPECIFIED: ICD-10-CM

## 2024-12-01 DIAGNOSIS — Z11.52 ENCOUNTER FOR SCREENING FOR COVID-19: ICD-10-CM

## 2024-12-01 DIAGNOSIS — R29.6 REPEATED FALLS: ICD-10-CM

## 2024-12-01 DIAGNOSIS — I27.20 PULMONARY HYPERTENSION, UNSPECIFIED: ICD-10-CM

## 2024-12-01 DIAGNOSIS — R41.2 RETROGRADE AMNESIA: ICD-10-CM

## 2024-12-01 DIAGNOSIS — N18.30 CHRONIC KIDNEY DISEASE, STAGE 3 UNSPECIFIED: ICD-10-CM

## 2024-12-01 DIAGNOSIS — N39.0 URINARY TRACT INFECTION, SITE NOT SPECIFIED: ICD-10-CM

## 2024-12-01 LAB
ALBUMIN SERPL ELPH-MCNC: 3.8 G/DL — SIGNIFICANT CHANGE UP (ref 3.5–5.2)
ALP SERPL-CCNC: 94 U/L — SIGNIFICANT CHANGE UP (ref 30–115)
ALT FLD-CCNC: 14 U/L — SIGNIFICANT CHANGE UP (ref 0–41)
ANION GAP SERPL CALC-SCNC: 9 MMOL/L — SIGNIFICANT CHANGE UP (ref 7–14)
APPEARANCE UR: CLEAR — SIGNIFICANT CHANGE UP
AST SERPL-CCNC: 32 U/L — SIGNIFICANT CHANGE UP (ref 0–41)
BACTERIA # UR AUTO: NEGATIVE /HPF — SIGNIFICANT CHANGE UP
BASOPHILS # BLD AUTO: 0.04 K/UL — SIGNIFICANT CHANGE UP (ref 0–0.2)
BASOPHILS NFR BLD AUTO: 0.2 % — SIGNIFICANT CHANGE UP (ref 0–1)
BILIRUB SERPL-MCNC: 0.3 MG/DL — SIGNIFICANT CHANGE UP (ref 0.2–1.2)
BILIRUB UR-MCNC: NEGATIVE — SIGNIFICANT CHANGE UP
BUN SERPL-MCNC: 35 MG/DL — HIGH (ref 10–20)
CALCIUM SERPL-MCNC: 8.6 MG/DL — SIGNIFICANT CHANGE UP (ref 8.4–10.4)
CAST: 0 /LPF — SIGNIFICANT CHANGE UP (ref 0–4)
CHLORIDE SERPL-SCNC: 101 MMOL/L — SIGNIFICANT CHANGE UP (ref 98–110)
CO2 SERPL-SCNC: 26 MMOL/L — SIGNIFICANT CHANGE UP (ref 17–32)
COLOR SPEC: YELLOW — SIGNIFICANT CHANGE UP
CREAT SERPL-MCNC: 1.3 MG/DL — SIGNIFICANT CHANGE UP (ref 0.7–1.5)
DIFF PNL FLD: NEGATIVE — SIGNIFICANT CHANGE UP
EGFR: 56 ML/MIN/1.73M2 — LOW
EOSINOPHIL # BLD AUTO: 0.03 K/UL — SIGNIFICANT CHANGE UP (ref 0–0.7)
EOSINOPHIL NFR BLD AUTO: 0.1 % — SIGNIFICANT CHANGE UP (ref 0–8)
FLUAV AG NPH QL: SIGNIFICANT CHANGE UP
FLUBV AG NPH QL: SIGNIFICANT CHANGE UP
GLUCOSE SERPL-MCNC: 94 MG/DL — SIGNIFICANT CHANGE UP (ref 70–99)
GLUCOSE UR QL: NEGATIVE MG/DL — SIGNIFICANT CHANGE UP
HCT VFR BLD CALC: 32.1 % — LOW (ref 42–52)
HGB BLD-MCNC: 10.9 G/DL — LOW (ref 14–18)
IMM GRANULOCYTES NFR BLD AUTO: 0.4 % — HIGH (ref 0.1–0.3)
KETONES UR-MCNC: ABNORMAL MG/DL
LACTATE SERPL-SCNC: 1.4 MMOL/L — SIGNIFICANT CHANGE UP (ref 0.7–2)
LEUKOCYTE ESTERASE UR-ACNC: ABNORMAL
LYMPHOCYTES # BLD AUTO: 0.88 K/UL — LOW (ref 1.2–3.4)
LYMPHOCYTES # BLD AUTO: 4.3 % — LOW (ref 20.5–51.1)
MAGNESIUM SERPL-MCNC: 2.1 MG/DL — SIGNIFICANT CHANGE UP (ref 1.8–2.4)
MCHC RBC-ENTMCNC: 32.2 PG — HIGH (ref 27–31)
MCHC RBC-ENTMCNC: 34 G/DL — SIGNIFICANT CHANGE UP (ref 32–37)
MCV RBC AUTO: 95 FL — HIGH (ref 80–94)
MONOCYTES # BLD AUTO: 1.23 K/UL — HIGH (ref 0.1–0.6)
MONOCYTES NFR BLD AUTO: 6 % — SIGNIFICANT CHANGE UP (ref 1.7–9.3)
NEUTROPHILS # BLD AUTO: 18.2 K/UL — HIGH (ref 1.4–6.5)
NEUTROPHILS NFR BLD AUTO: 89 % — HIGH (ref 42.2–75.2)
NITRITE UR-MCNC: NEGATIVE — SIGNIFICANT CHANGE UP
NRBC # BLD: 0 /100 WBCS — SIGNIFICANT CHANGE UP (ref 0–0)
PH UR: 7.5 — SIGNIFICANT CHANGE UP (ref 5–8)
PLATELET # BLD AUTO: 247 K/UL — SIGNIFICANT CHANGE UP (ref 130–400)
PMV BLD: 11.2 FL — HIGH (ref 7.4–10.4)
POTASSIUM SERPL-MCNC: 4.5 MMOL/L — SIGNIFICANT CHANGE UP (ref 3.5–5)
POTASSIUM SERPL-SCNC: 4.5 MMOL/L — SIGNIFICANT CHANGE UP (ref 3.5–5)
PROT SERPL-MCNC: 6.1 G/DL — SIGNIFICANT CHANGE UP (ref 6–8)
PROT UR-MCNC: 30 MG/DL
RBC # BLD: 3.38 M/UL — LOW (ref 4.7–6.1)
RBC # FLD: 12.7 % — SIGNIFICANT CHANGE UP (ref 11.5–14.5)
RBC CASTS # UR COMP ASSIST: 0 /HPF — SIGNIFICANT CHANGE UP (ref 0–4)
RSV RNA NPH QL NAA+NON-PROBE: SIGNIFICANT CHANGE UP
SARS-COV-2 RNA SPEC QL NAA+PROBE: SIGNIFICANT CHANGE UP
SODIUM SERPL-SCNC: 136 MMOL/L — SIGNIFICANT CHANGE UP (ref 135–146)
SP GR SPEC: 1.02 — SIGNIFICANT CHANGE UP (ref 1–1.03)
SQUAMOUS # UR AUTO: 2 /HPF — SIGNIFICANT CHANGE UP (ref 0–5)
TROPONIN T, HIGH SENSITIVITY RESULT: 46 NG/L — HIGH (ref 6–21)
TROPONIN T, HIGH SENSITIVITY RESULT: 47 NG/L — HIGH (ref 6–21)
UROBILINOGEN FLD QL: 1 MG/DL — SIGNIFICANT CHANGE UP (ref 0.2–1)
WBC # BLD: 20.46 K/UL — HIGH (ref 4.8–10.8)
WBC # FLD AUTO: 20.46 K/UL — HIGH (ref 4.8–10.8)
WBC UR QL: 10 /HPF — HIGH (ref 0–5)

## 2024-12-01 PROCEDURE — 80354 DRUG SCREENING FENTANYL: CPT

## 2024-12-01 PROCEDURE — 80321 ALCOHOLS BIOMARKERS 1OR 2: CPT

## 2024-12-01 PROCEDURE — 99222 1ST HOSP IP/OBS MODERATE 55: CPT | Mod: GC

## 2024-12-01 PROCEDURE — 80349 CANNABINOIDS NATURAL: CPT

## 2024-12-01 PROCEDURE — 83735 ASSAY OF MAGNESIUM: CPT

## 2024-12-01 PROCEDURE — 86850 RBC ANTIBODY SCREEN: CPT

## 2024-12-01 PROCEDURE — 97162 PT EVAL MOD COMPLEX 30 MIN: CPT | Mod: GP

## 2024-12-01 PROCEDURE — 71045 X-RAY EXAM CHEST 1 VIEW: CPT | Mod: 26

## 2024-12-01 PROCEDURE — 99285 EMERGENCY DEPT VISIT HI MDM: CPT

## 2024-12-01 PROCEDURE — 80048 BASIC METABOLIC PNL TOTAL CA: CPT

## 2024-12-01 PROCEDURE — 95819 EEG AWAKE AND ASLEEP: CPT

## 2024-12-01 PROCEDURE — 84100 ASSAY OF PHOSPHORUS: CPT

## 2024-12-01 PROCEDURE — 87086 URINE CULTURE/COLONY COUNT: CPT

## 2024-12-01 PROCEDURE — 85027 COMPLETE CBC AUTOMATED: CPT

## 2024-12-01 PROCEDURE — 86900 BLOOD TYPING SEROLOGIC ABO: CPT

## 2024-12-01 PROCEDURE — 36415 COLL VENOUS BLD VENIPUNCTURE: CPT

## 2024-12-01 PROCEDURE — 93010 ELECTROCARDIOGRAM REPORT: CPT

## 2024-12-01 PROCEDURE — 80307 DRUG TEST PRSMV CHEM ANLYZR: CPT

## 2024-12-01 PROCEDURE — 86901 BLOOD TYPING SEROLOGIC RH(D): CPT

## 2024-12-01 PROCEDURE — 70450 CT HEAD/BRAIN W/O DYE: CPT | Mod: 26,MC

## 2024-12-01 PROCEDURE — 80076 HEPATIC FUNCTION PANEL: CPT

## 2024-12-01 RX ORDER — BUSPIRONE HCL 15 MG
15 TABLET ORAL
Refills: 0 | Status: DISCONTINUED | OUTPATIENT
Start: 2024-12-01 | End: 2024-12-04

## 2024-12-01 RX ORDER — ENOXAPARIN SODIUM 30 MG/.3ML
40 INJECTION SUBCUTANEOUS EVERY 24 HOURS
Refills: 0 | Status: DISCONTINUED | OUTPATIENT
Start: 2024-12-01 | End: 2024-12-04

## 2024-12-01 RX ORDER — DIVALPROEX SODIUM 500 MG
250 TABLET, DELAYED RELEASE (ENTERIC COATED) ORAL DAILY
Refills: 0 | Status: DISCONTINUED | OUTPATIENT
Start: 2024-12-01 | End: 2024-12-04

## 2024-12-01 RX ORDER — LORAZEPAM 2 MG/1
2 TABLET ORAL
Refills: 0 | Status: DISCONTINUED | OUTPATIENT
Start: 2024-12-01 | End: 2024-12-01

## 2024-12-01 RX ORDER — LORAZEPAM 2 MG/1
1 TABLET ORAL
Refills: 0 | Status: DISCONTINUED | OUTPATIENT
Start: 2024-12-01 | End: 2024-12-04

## 2024-12-01 RX ORDER — ROSUVASTATIN CALCIUM 5 MG/1
20 TABLET, FILM COATED ORAL AT BEDTIME
Refills: 0 | Status: DISCONTINUED | OUTPATIENT
Start: 2024-12-01 | End: 2024-12-04

## 2024-12-01 RX ORDER — METOPROLOL TARTRATE 100 MG/1
25 TABLET, FILM COATED ORAL
Refills: 0 | Status: DISCONTINUED | OUTPATIENT
Start: 2024-12-01 | End: 2024-12-04

## 2024-12-01 RX ORDER — TRAZODONE HYDROCHLORIDE 150 MG/1
100 TABLET ORAL AT BEDTIME
Refills: 0 | Status: DISCONTINUED | OUTPATIENT
Start: 2024-12-01 | End: 2024-12-04

## 2024-12-01 RX ORDER — CYANOCOBALAMIN/FOLIC AC/VIT B6 1-2.2-25MG
1 TABLET ORAL DAILY
Refills: 0 | Status: DISCONTINUED | OUTPATIENT
Start: 2024-12-01 | End: 2024-12-04

## 2024-12-01 RX ORDER — VANCOMYCIN HCL 900 MCG/MG
1000 POWDER (GRAM) MISCELLANEOUS ONCE
Refills: 0 | Status: COMPLETED | OUTPATIENT
Start: 2024-12-01 | End: 2024-12-01

## 2024-12-01 RX ORDER — CHOLECALCIFEROL (VITAMIN D3) 10MCG/0.25
125 DROPS ORAL
Refills: 0 | DISCHARGE

## 2024-12-01 RX ORDER — MEMANTINE HYDROCHLORIDE 14 MG/1
1 CAPSULE, EXTENDED RELEASE ORAL
Refills: 0 | DISCHARGE

## 2024-12-01 RX ORDER — NIFEDIPINE 10 MG
60 CAPSULE ORAL DAILY
Refills: 0 | Status: DISCONTINUED | OUTPATIENT
Start: 2024-12-01 | End: 2024-12-04

## 2024-12-01 RX ORDER — ACETAMINOPHEN 500MG 500 MG/1
650 TABLET, COATED ORAL EVERY 6 HOURS
Refills: 0 | Status: DISCONTINUED | OUTPATIENT
Start: 2024-12-01 | End: 2024-12-04

## 2024-12-01 RX ORDER — CEFTRIAXONE SODIUM 1 G
2000 VIAL (EA) INJECTION EVERY 24 HOURS
Refills: 0 | Status: DISCONTINUED | OUTPATIENT
Start: 2024-12-01 | End: 2024-12-04

## 2024-12-01 RX ORDER — CEFEPIME 2 G/1
2000 INJECTION, POWDER, FOR SOLUTION INTRAVENOUS ONCE
Refills: 0 | Status: COMPLETED | OUTPATIENT
Start: 2024-12-01 | End: 2024-12-01

## 2024-12-01 RX ORDER — ACETAMINOPHEN 500MG 500 MG/1
650 TABLET, COATED ORAL ONCE
Refills: 0 | Status: COMPLETED | OUTPATIENT
Start: 2024-12-01 | End: 2024-12-01

## 2024-12-01 RX ORDER — MEMANTINE HYDROCHLORIDE 14 MG/1
5 CAPSULE, EXTENDED RELEASE ORAL
Refills: 0 | Status: DISCONTINUED | OUTPATIENT
Start: 2024-12-01 | End: 2024-12-04

## 2024-12-01 RX ORDER — ACETAMINOPHEN, DIPHENHYDRAMINE HCL, PHENYLEPHRINE HCL 325; 25; 5 MG/1; MG/1; MG/1
5 TABLET ORAL AT BEDTIME
Refills: 0 | Status: DISCONTINUED | OUTPATIENT
Start: 2024-12-01 | End: 2024-12-04

## 2024-12-01 RX ORDER — SODIUM CHLORIDE 9 MG/ML
2300 INJECTION, SOLUTION INTRAMUSCULAR; INTRAVENOUS; SUBCUTANEOUS ONCE
Refills: 0 | Status: COMPLETED | OUTPATIENT
Start: 2024-12-01 | End: 2024-12-01

## 2024-12-01 RX ORDER — GLUCOSAMINE SULFATE DIPOT CHLR 500 MG
1 CAPSULE ORAL DAILY
Refills: 0 | Status: DISCONTINUED | OUTPATIENT
Start: 2024-12-01 | End: 2024-12-04

## 2024-12-01 RX ORDER — DONEPEZIL HYDROCHLORIDE 5 MG/1
5 TABLET, FILM COATED ORAL AT BEDTIME
Refills: 0 | Status: DISCONTINUED | OUTPATIENT
Start: 2024-12-01 | End: 2024-12-04

## 2024-12-01 RX ORDER — LANOLIN ALCOHOL/MO/W.PET/CERES
100 CREAM (GRAM) TOPICAL DAILY
Refills: 0 | Status: COMPLETED | OUTPATIENT
Start: 2024-12-01 | End: 2024-12-04

## 2024-12-01 RX ADMIN — ROSUVASTATIN CALCIUM 20 MILLIGRAM(S): 5 TABLET, FILM COATED ORAL at 22:20

## 2024-12-01 RX ADMIN — TRAZODONE HYDROCHLORIDE 100 MILLIGRAM(S): 150 TABLET ORAL at 22:20

## 2024-12-01 RX ADMIN — ACETAMINOPHEN, DIPHENHYDRAMINE HCL, PHENYLEPHRINE HCL 5 MILLIGRAM(S): 325; 25; 5 TABLET ORAL at 22:20

## 2024-12-01 RX ADMIN — ACETAMINOPHEN 500MG 650 MILLIGRAM(S): 500 TABLET, COATED ORAL at 18:13

## 2024-12-01 RX ADMIN — DONEPEZIL HYDROCHLORIDE 5 MILLIGRAM(S): 5 TABLET, FILM COATED ORAL at 22:20

## 2024-12-01 RX ADMIN — Medication 250 MILLIGRAM(S): at 18:30

## 2024-12-01 RX ADMIN — CEFEPIME 100 MILLIGRAM(S): 2 INJECTION, POWDER, FOR SOLUTION INTRAVENOUS at 18:10

## 2024-12-01 RX ADMIN — SODIUM CHLORIDE 2300 MILLILITER(S): 9 INJECTION, SOLUTION INTRAMUSCULAR; INTRAVENOUS; SUBCUTANEOUS at 17:58

## 2024-12-01 NOTE — PATIENT PROFILE ADULT - NSPROPTRIGHTCAREGIVER_GEN_A_NUR
Render In Strict Bullet Format?: No Detail Level: Zone Plan: Discussed systemic medications if worsens, will consider in the future. Rosa Elena does not feel she needs systemic medications at this point today. Continue Regimen: Clobetasol to shins and elbows.\\n\\nTriamcinolone Cream PRN flares to ears x two weeks on, one week off declines

## 2024-12-01 NOTE — ED PROVIDER NOTE - DIFFERENTIAL DIAGNOSIS
Differential Diagnosis Possible seizure r/o ETOH withdrawal vs intracranial pathology vs infection vs electrolyte abnormality vs dehydration vs anemia vs cardiac etiology vs other metabolic derangement

## 2024-12-01 NOTE — H&P ADULT - ASSESSMENT
79 M w/ PMHx of dementia, HTN, alcohol abuse, alcohol withdrawal seizure, multiple recent admissions for fall/syncope, seizure episode was brought from the  [facility] for evaluation of the seizure episode.      #Witnessed seizure episodes   #Suspected alcohol withdrawal seizure  - As per patient does not remember his last alcohol dose  - EEG  - Urine drug screen  - Serum alcohol and PETH levels  - CIWA monitor  - Ativan as per [facility] protocol  - Thiamine, folic acid   - seizure precautions  - neurology consult  - CATCH team consult      #Acute UTI  #Sepsis ( fever 100.4 F, wbc 20k, source of infection )   - s/p 2300ml NS in ed  - sp vancomycin and cefepime in ed   - fu blood cultures  - fu urine cultures  - iv ceftriaxone 1gm q12hr ( adjusted for gfr)       #Dementia  - cw memantine   - cw Donepezil     #HTN  - cw nifedipine     #Anxiety  #Mood disorder  - cw Depakote  - cw buspirone       DVT PPX: lovenox   GI PPX: none   DIET: dash   ACTIVITY:   CODE STATUS: full   DISPOSITION:     PENDING:   Please confirm med rec in am with facility   -        79 M w/ PMHx of dementia, HTN, alcohol abuse, alcohol withdrawal seizure, multiple recent admissions for fall/syncope, seizure episode was brought from the  [facility] for evaluation of the seizure episode.      #Witnessed seizure episodes   #Suspected alcohol withdrawal seizure  - ct head: no acute pathology   - As per patient does not remember his last alcohol dose  - fu EEG  - fu Urine drug screen  - fu Serum alcohol and PETH levels  - [facility] monitor  - Ativan as per [facility] protocol  - Thiamine, folic acid   - seizure precautions  - neurology consult  - CATCH team consult      #Acute UTI  #Sepsis ( fever 100.4 F, wbc 20k, source of infection )   - s/p 2300ml NS in ed  - sp vancomycin and cefepime in ed   - fu blood cultures  - fu urine cultures  - iv ceftriaxone 1gm q12hr ( adjusted for gfr)     #Mild pHTN  #(Grade II diastolic dysfunction).  #Basal septal hypertrophy (2.0cm)  #CKD 3  - xray chest-- mild pulm vascular congestion  - avoid aggressive iv fluids  - adjust meds per GFR  - strict in/output record  - consider cardiology and nephrology  evaluation      #Dementia  - cw memantine   - cw Donepezil     #HTN  - cw nifedipine     #Anxiety  #Mood disorder  - cw Depakote  - cw buspirone       DVT PPX: lovenox   GI PPX: none   DIET: dash   ACTIVITY:   CODE STATUS: full   DISPOSITION:     PENDING:   Please confirm med rec in am with facility   -        79 M w/ PMHx of dementia, HTN, alcohol abuse, alcohol withdrawal seizure, multiple recent admissions for fall/syncope, seizure episode was brought from the  [facility] for evaluation of the seizure episode.      #Witnessed seizure episodes   #Suspected alcohol withdrawal seizure  - ct head: no acute pathology   - As per patient does not remember his last alcohol dose  - fu EEG  - fu Urine drug screen  - fu Serum alcohol and PETH levels  - [facility] monitor  - Ativan as per [facility] protocol  - Thiamine, folic acid   - seizure precautions  - neurology consult  - CATCH team consult      #Acute UTI  #Sepsis ( fever 100.4 F, wbc 20k, source of infection )   - s/p 2300ml NS in ed  - sp vancomycin and cefepime in ed   - fu blood cultures  - fu urine cultures  - iv ceftriaxone 2gm q24 hr  ( adjusted for gfr)     #Mild pHTN  #(Grade II diastolic dysfunction).  #Basal septal hypertrophy (2.0cm)  #CKD 3  - xray chest-- mild pulm vascular congestion  - avoid aggressive iv fluids  - adjust meds per GFR  - strict in/output record  - consider cardiology and nephrology  evaluation      #Dementia  - cw memantine   - cw Donepezil     #HTN  - cw nifedipine     #Anxiety  #Mood disorder  - cw Depakote  - cw buspirone       DVT PPX: lovenox   GI PPX: none   DIET: dash   ACTIVITY:   CODE STATUS: full   DISPOSITION:     PENDING:   Please confirm med rec in am with facility   -

## 2024-12-01 NOTE — ED PROVIDER NOTE - CLINICAL SUMMARY MEDICAL DECISION MAKING FREE TEXT BOX
Patient presented with reported shaking episode, possibly seizure activity versus syncope prior to arrival.  On arrival to ED, patient febrile by rectal temp with evidence of sepsis and therefore was treated with broad-spectrum antibiotics and IV fluids.  Labs obtained and remarkable for leukocytosis to 20,000 but otherwise were grossly unremarkable including no significant anemia, signs of dehydration/VANIA, transaminitis or significant electrolyte abnormalities.  Troponin mildly elevated, but EKG nonischemic.  UA positive for likely UTI which is possible source.  CT of the head obtained and negative for emergent intracranial pathologies to explain seizure activity.  Chest x-ray showed bilateral congestion, but no focal consolidations to suggest pneumonia.  Patient remained stable during ED course without the need for pressors, however given the above, patient will require admission for IV antibiotics and further management.  Hemodynamically stable at time of admission.

## 2024-12-01 NOTE — ED ADULT TRIAGE NOTE - CHIEF COMPLAINT QUOTE
william, s/p seizure while on bed today witnessed by staff at assisted living , no falls in injuries. Pt denies alcohol use states "I haven't had a drink in 3 months" FS WNL w/ EMS

## 2024-12-01 NOTE — ED PROVIDER NOTE - CARE PLAN
Principal Discharge DX:	Sepsis  Secondary Diagnosis:	Leukocytosis  Secondary Diagnosis:	Acute UTI   1

## 2024-12-01 NOTE — H&P ADULT - HISTORY OF PRESENT ILLNESS
79 M w/ PMHx of dementia, HTN, alcohol abuse, alcohol withdrawal seizure, multiple recent admissions for fall/syncope, seizure episode was brought from the  [facility] for evaluation of the seizure episode. Patient is aox3 at time of the interview. Patient states that he is completely fine and does not know the reason why he was brought to the ed. He further states that he want to go home, as he does not have any illness. Called the Facility multiple times via phone call  and spoke to Desi. She states that she does not have any information why patient was brought to the ed and recommended to call in am after 10 o clock.  Hx taken from the chart. On chart review, Patient had seizure while on bed today witnessed by staff at assisted living , no falls in injuries.Pt reportedly had episode where his whole body was shaking and eyes had rolled back. Episode was reportedly brief. On review of the systems, he denies any headache, dizziness, loss of consciousness chest pain, shortness of breath, palpitations nausea, vomit, diarrhea, constipation, dysuria, frequency, urgency, extremity weakness , and other significant complaints..     ED VITALS    · BP Systolic	154 mm Hg  · BP Diastolic	70 mm Hg  · Heart Rate	84 /min  · Respiration Rate (breaths/min)	18 /min  · Temp (F)	97 Degrees F  · Temp (C)	36.1 Degrees C  · Temp site	oral  · SpO2 (%)	95 %  · O2 Delivery/Oxygen Delivery Method	room air  · Temp at ED Arrival (C)	36.1 Degrees C      LABS    wbc 20k, hb 10, trops 47,46, gfr 56, urine + wbc, leukocyte esterase, rvp -ve,     Imaging    ct head: no acute pathology    xray chest: mild pulm vascular congestion     ekg: normal sinus rythym, no acute t wave and st segment changes

## 2024-12-01 NOTE — PATIENT PROFILE ADULT - FALL HARM RISK - HARM RISK INTERVENTIONS
Assistance with ambulation/Assistance OOB with selected safe patient handling equipment/Communicate Risk of Fall with Harm to all staff/Discuss with provider need for PT consult/Monitor gait and stability/Provide patient with walking aids - walker, cane, crutches/Reinforce activity limits and safety measures with patient and family/Tailored Fall Risk Interventions/Use of alarms - bed, chair and/or voice tab/Visual Cue: Yellow wristband and red socks/Bed in lowest position, wheels locked, appropriate side rails in place/Call bell, personal items and telephone in reach/Instruct patient to call for assistance before getting out of bed or chair/Non-slip footwear when patient is out of bed/[place] to call system/Physically safe environment - no spills, clutter or unnecessary equipment/Purposeful Proactive Rounding/Room/bathroom lighting operational, light cord in reach

## 2024-12-01 NOTE — ED PROVIDER NOTE - PHYSICAL EXAMINATION
CONSTITUTIONAL: Well-appearing; well-nourished; in no apparent distress.   EYES: PERRL; EOM intact.   ENT: normal nose; no rhinorrhea; normal pharynx with no tonsillar hypertrophy.   NECK: Supple; non-tender; no cervical lymphadenopathy.   CARDIOVASCULAR: Normal S1, S2; no murmurs, rubs, or gallops. Equal radial pulses  RESPIRATORY: Normal chest excursion with respiration; breath sounds clear and equal bilaterally; no wheezes, rhonchi, or rales.  GI/: Normal bowel sounds; non-distended; non-tender; no palpable organomegaly.   MS: No evidence of trauma or deformity.  Normal ROM in all four extremities; non-tender to palpation; distal pulses are normal.   SKIN: Normal for age and race; warm; dry; good turgor; no apparent lesions or exudate.   NEURO/PSYCH: A & O x 4; grossly unremarkable. No focal deficits. No facial droop. No tongue deviation. Cerebellar intact. Normal gait. Sensation intact

## 2024-12-01 NOTE — H&P ADULT - ATTENDING COMMENTS
HPI:  79 M w/ PMHx of dementia, HTN, alcohol abuse, alcohol withdrawal seizure, multiple recent admissions for fall/syncope, seizure episode was brought from the  [facility] for evaluation of the seizure episode. Patient is aox3 at time of the interview. Patient states that he is completely fine and does not know the reason why he was brought to the ed. He further states that he want to go home, as he does not have any illness. Called the Facility multiple times via phone call  and spoke to Desi. She states that she does not have any information why patient was brought to the ed and recommended to call in am after 10 o clock.  Hx taken from the chart. On chart review, Patient had seizure while on bed today witnessed by staff at assisted living , no falls in injuries.Pt reportedly had episode where his whole body was shaking and eyes had rolled back. Episode was reportedly brief. On review of the systems, he denies any headache, dizziness, loss of consciousness chest pain, shortness of breath, palpitations nausea, vomit, diarrhea, constipation, dysuria, frequency, urgency, extremity weakness , and other significant complaints..     ED VITALS    · BP Systolic	154 mm Hg  · BP Diastolic	70 mm Hg  · Heart Rate	84 /min  · Respiration Rate (breaths/min)	18 /min  · Temp (F)	97 Degrees F  · Temp (C)	36.1 Degrees C  · Temp site	oral  · SpO2 (%)	95 %  · O2 Delivery/Oxygen Delivery Method	room air  · Temp at ED Arrival (C)	36.1 Degrees C  LABS  wbc 20k, hb 10, trops 47,46, gfr 56, urine + wbc, leukocyte esterase, rvp -ve,     Imaging  ct head: no acute pathology  xray chest: mild pulm vascular congestion     ekg: normal sinus rythym, no acute t wave and st segment changes        (01 Dec 2024 21:15)  REVIEW OF SYSTEMS: see cc/HPI   CONSTITUTIONAL: No weakness, fevers or chills  EYES/ENT: No visual changes;  Poor historian but denies any other  NECK: No pain or stiffness  RESPIRATORY: No cough, wheezing, hemoptysis; No shortness of breath  CARDIOVASCULAR: No chest pain or palpitations  GASTROINTESTINAL: No abdominal or epigastric pain. No nausea, vomiting, or hematemesis; No diarrhea or constipation. Poor historian but denies any other  GENITOURINARY: No dysuria, frequency or hematuria  NEUROLOGICAL: No numbness or weakness. H/o events from the chart / NH. Poor historian but denies any other  SKIN: No itching, rashes  ENDO: No hyperglycemia, No thyroid disorder, No dyslipidemia   HEM: No bleeding, No easy bruising, No anemia   PSYCHE: No psychosis, No mood disorder No hallucinations No delusion   MSK: No deformity, No fracture, No Joint swelling     Physical Exam:   General: WN/WD NAD  Neurology: A&Ox3, nonfocal, follows commands  Eyes: PERRLA/ EOMI  ENT/Neck: Neck supple, trachea midline, No JVD  Respiratory: CTA B/L, No wheezing, rales, rhonchi  CV: Normal rate regular rhythm, S1S2, no murmurs, rubs or gallops  Abdominal: Soft, NT, ND +BS,   Extremities: No edema, + peripheral pulses  Skin: No Rashes, Hematoma, Ecchymosis  Incisions:       A/p  Seizure     Urosepsis / Acute UTI     H/o CKD III   H/o HTN     MDD / Behavioral   -c/w Depakote / Buspirone     PATIENT SEEN by ATTENDING 12/1/24 ( NOTE INCOMPLETE) HPI:  79 M w/ PMHx of dementia, HTN, alcohol abuse, alcohol withdrawal seizure, multiple recent admissions for fall/syncope, seizure episode was brought from the  [facility] for evaluation of the seizure episode. Patient is aox3 at time of the interview. Patient states that he is completely fine and does not know the reason why he was brought to the ed. He further states that he want to go home, as he does not have any illness. Called the Facility multiple times via phone call  and spoke to Desi. She states that she does not have any information why patient was brought to the ed and recommended to call in am after 10 o clock.  Hx taken from the chart. On chart review, Patient had seizure while on bed today witnessed by staff at assisted living , no falls in injuries.Pt reportedly had episode where his whole body was shaking and eyes had rolled back. Episode was reportedly brief. On review of the systems, he denies any headache, dizziness, loss of consciousness chest pain, shortness of breath, palpitations nausea, vomit, diarrhea, constipation, dysuria, frequency, urgency, extremity weakness , and other significant complaints..     ED VITALS    · BP Systolic	154 mm Hg  · BP Diastolic	70 mm Hg  · Heart Rate	84 /min  · Respiration Rate (breaths/min)	18 /min  · Temp (F)	97 Degrees F  · Temp (C)	36.1 Degrees C  · Temp site	oral  · SpO2 (%)	95 %  · O2 Delivery/Oxygen Delivery Method	room air  · Temp at ED Arrival (C)	36.1 Degrees C  LABS  wbc 20k, hb 10, trops 47,46, gfr 56, urine + wbc, leukocyte esterase, rvp -ve,     Imaging  ct head: no acute pathology  xray chest: mild pulm vascular congestion     ekg: normal sinus rythym, no acute t wave and st segment changes        (01 Dec 2024 21:15)  REVIEW OF SYSTEMS: see cc/HPI   CONSTITUTIONAL: No weakness, fevers or chills  EYES/ENT: No visual changes;  Poor historian but denies any other  NECK: No pain or stiffness  RESPIRATORY: No cough, wheezing, hemoptysis; No shortness of breath  CARDIOVASCULAR: No chest pain or palpitations  GASTROINTESTINAL: No abdominal or epigastric pain. No nausea, vomiting, or hematemesis; No diarrhea or constipation. Poor historian but denies any other  GENITOURINARY: No dysuria, frequency or hematuria  NEUROLOGICAL: No numbness or weakness. H/o events from the chart / NH. Poor historian but denies any other  SKIN: No itching, rashes  ENDO: No hyperglycemia, No thyroid disorder, No dyslipidemia   HEM: No bleeding, No easy bruising, No anemia   PSYCHE: No psychosis, No mood disorder No hallucinations No delusion   MSK: No deformity, No fracture, No Joint swelling     Physical Exam:   General: WN/WD NAD  Neurology: A&Ox3, nonfocal, follows commands  Eyes: PERRLA/ EOMI  ENT/Neck: Neck supple, trachea midline, No JVD  Respiratory: CTA B/L, No wheezing, rales, rhonchi  CV: Normal rate regular rhythm, S1S2, no murmurs, rubs or gallops  Abdominal: Soft, NT, ND +BS,   Extremities: No edema, + peripheral pulses  Skin: No Rashes, Hematoma, Ecchymosis  Incisions:       A/p  Witness Seizure- patient w/o aura or prodrome   H/o Alcohol withdrawal seizures   -Neurology eval   -rEEG   -CIWA w/ ativan PRN   -fall and seizure prophylaxis     Urosepsis / Acute UTI   -IV abx   -check Ucx and blood Cx    H/o CKD III   H/o HTN   pHTN   c/w OP Rx     Dementia   MDD / Behavioral   -c/w Depakote / Buspirone     DVT prophylaxis     PATIENT SEEN by ATTENDING 12/1/24

## 2024-12-01 NOTE — H&P ADULT - NSHPPHYSICALEXAM_GEN_ALL_CORE
LOS:     VITALS:   T(C): 36.4 (12-01-24 @ 19:45), Max: 38 (12-01-24 @ 17:50)  HR: 84 (12-01-24 @ 19:50) (82 - 84)  BP: 146/72 (12-01-24 @ 19:50) (144/72 - 154/70)  RR: 18 (12-01-24 @ 19:50) (18 - 18)  SpO2: 96% (12-01-24 @ 19:50) (95% - 96%)    GENERAL: NAD, lying in bed comfortably  HEAD:  Atraumatic, Normocephalic  EYES: EOMI, PERRLA, conjunctiva and sclera clear  ENT: Moist mucous membranes  NECK: Supple, No JVD  CHEST/LUNG: Clear to auscultation bilaterally; No rales, rhonchi, wheezing, or rubs. Unlabored respirations  HEART: Regular rate and rhythm; No murmurs, rubs, or gallops  ABDOMEN: BSx4; Soft, nontender, nondistended  EXTREMITIES:  no edema   NERVOUS SYSTEM:  A&Ox3, no focal deficits   SKIN: No rashes or lesions

## 2024-12-01 NOTE — ED PROVIDER NOTE - OBJECTIVE STATEMENT
79 year old M from [facility] with hx of dementia, etoh abuse, alc withdrawal seizure presenting to er for eval. Limited hx from staff at assisted living but pt reportedly had episode where his whole body was shaking and eyes had rolled back. Episode was reportedly brief. Pt in ed sts does not recall event, sts he feels in his normal state of health today and denies any acute complaints including headache, dizziness, nausea, vomiting, extremity numbness or weakness, inability to bare weight or ambulate, neck or back pain.

## 2024-12-01 NOTE — PATIENT PROFILE ADULT - NSFALLSECTIONLABEL_GEN_A_CORE
[FreeTextEntry1] : 4 YO here for Lt. Otitis Externa\par Floxin Otic sent\par Education provided:\par \par Treatments can include:\par \par -Ear drops – Be sure to finish all the medicine, even if you feel better after a few days. When you use ear drops, you should:\par \par -Lie on your side or tilt your head, with the affected ear facing up\par \par -Make sure the ear drops go into the ear canal\par \par -Stay in this position for 20 minutes (after the ear drops are put in)\par \par -Medicines to relieve pain\par It is important to keep the inside of the ear dry while the infection heals. You should not swim for 7 to 10 days after starting treatment. You can take a shower, but make sure to keep the ear dry. \par You should also avoid wearing hearing aids or headphones, or putting anything into the infected ear, until your symptoms improve.\par \par You can reduce your chances of getting an outer ear infection by:\par \par -Not sticking anything in your ears, even to clean them – The inside of the ears do not usually need to be cleaned. It is normal to have some ear wax in your ears. Ear wax protects the ear canal. But if you are worried that you have too much ear wax, talk to your doctor or nurse. He or she can look in your ears and tell you how to clean them safely.\par \par -Shake your ears dry after you swim, or use a blow dryer to help dry them. If you use a blow dryer, put it on the lowest setting and be careful to avoid burns.\par RTC for WCC/PRN\par  .

## 2024-12-02 LAB
ALBUMIN SERPL ELPH-MCNC: 3.4 G/DL — LOW (ref 3.5–5.2)
ALP SERPL-CCNC: 85 U/L — SIGNIFICANT CHANGE UP (ref 30–115)
ALT FLD-CCNC: 11 U/L — SIGNIFICANT CHANGE UP (ref 0–41)
AMPHET UR-MCNC: NEGATIVE — SIGNIFICANT CHANGE UP
ANION GAP SERPL CALC-SCNC: 8 MMOL/L — SIGNIFICANT CHANGE UP (ref 7–14)
AST SERPL-CCNC: 26 U/L — SIGNIFICANT CHANGE UP (ref 0–41)
BARBITURATES UR SCN-MCNC: NEGATIVE — SIGNIFICANT CHANGE UP
BENZODIAZ UR-MCNC: NEGATIVE — SIGNIFICANT CHANGE UP
BILIRUB DIRECT SERPL-MCNC: 0.2 MG/DL — SIGNIFICANT CHANGE UP (ref 0–0.3)
BILIRUB INDIRECT FLD-MCNC: 0.3 MG/DL — SIGNIFICANT CHANGE UP (ref 0.2–1.2)
BILIRUB SERPL-MCNC: 0.5 MG/DL — SIGNIFICANT CHANGE UP (ref 0.2–1.2)
BUN SERPL-MCNC: 27 MG/DL — HIGH (ref 10–20)
CALCIUM SERPL-MCNC: 8.6 MG/DL — SIGNIFICANT CHANGE UP (ref 8.4–10.5)
CHLORIDE SERPL-SCNC: 101 MMOL/L — SIGNIFICANT CHANGE UP (ref 98–110)
CO2 SERPL-SCNC: 24 MMOL/L — SIGNIFICANT CHANGE UP (ref 17–32)
COCAINE METAB.OTHER UR-MCNC: NEGATIVE — SIGNIFICANT CHANGE UP
CREAT SERPL-MCNC: 1.1 MG/DL — SIGNIFICANT CHANGE UP (ref 0.7–1.5)
DRUG SCREEN 1, URINE RESULT: SIGNIFICANT CHANGE UP
EGFR: 68 ML/MIN/1.73M2 — SIGNIFICANT CHANGE UP
ETHANOL SERPL-MCNC: <10 MG/DL — SIGNIFICANT CHANGE UP
FENTANYL UR QL: NEGATIVE — SIGNIFICANT CHANGE UP
GLUCOSE SERPL-MCNC: 120 MG/DL — HIGH (ref 70–99)
MAGNESIUM SERPL-MCNC: 2.1 MG/DL — SIGNIFICANT CHANGE UP (ref 1.8–2.4)
METHADONE UR-MCNC: NEGATIVE — SIGNIFICANT CHANGE UP
OPIATES UR-MCNC: NEGATIVE — SIGNIFICANT CHANGE UP
OXYCODONE UR-MCNC: NEGATIVE — SIGNIFICANT CHANGE UP
PCP UR-MCNC: NEGATIVE — SIGNIFICANT CHANGE UP
PHOSPHATE SERPL-MCNC: 2.9 MG/DL — SIGNIFICANT CHANGE UP (ref 2.1–4.9)
POTASSIUM SERPL-MCNC: 4 MMOL/L — SIGNIFICANT CHANGE UP (ref 3.5–5)
POTASSIUM SERPL-SCNC: 4 MMOL/L — SIGNIFICANT CHANGE UP (ref 3.5–5)
PROPOXYPHENE QUALITATIVE URINE RESULT: NEGATIVE — SIGNIFICANT CHANGE UP
PROT SERPL-MCNC: 5.5 G/DL — LOW (ref 6–8)
SODIUM SERPL-SCNC: 133 MMOL/L — LOW (ref 135–146)
THC UR QL: POSITIVE

## 2024-12-02 PROCEDURE — 99232 SBSQ HOSP IP/OBS MODERATE 35: CPT

## 2024-12-02 RX ADMIN — Medication 1 MILLIGRAM(S): at 11:06

## 2024-12-02 RX ADMIN — ENOXAPARIN SODIUM 40 MILLIGRAM(S): 30 INJECTION SUBCUTANEOUS at 11:07

## 2024-12-02 RX ADMIN — METOPROLOL TARTRATE 25 MILLIGRAM(S): 100 TABLET, FILM COATED ORAL at 17:12

## 2024-12-02 RX ADMIN — Medication 15 MILLIGRAM(S): at 05:01

## 2024-12-02 RX ADMIN — ROSUVASTATIN CALCIUM 20 MILLIGRAM(S): 5 TABLET, FILM COATED ORAL at 21:08

## 2024-12-02 RX ADMIN — DONEPEZIL HYDROCHLORIDE 5 MILLIGRAM(S): 5 TABLET, FILM COATED ORAL at 21:08

## 2024-12-02 RX ADMIN — MEMANTINE HYDROCHLORIDE 5 MILLIGRAM(S): 14 CAPSULE, EXTENDED RELEASE ORAL at 17:12

## 2024-12-02 RX ADMIN — Medication 60 MILLIGRAM(S): at 05:01

## 2024-12-02 RX ADMIN — Medication 100 MILLIGRAM(S): at 11:08

## 2024-12-02 RX ADMIN — METOPROLOL TARTRATE 25 MILLIGRAM(S): 100 TABLET, FILM COATED ORAL at 05:00

## 2024-12-02 RX ADMIN — Medication 15 MILLIGRAM(S): at 17:12

## 2024-12-02 RX ADMIN — Medication 100 MILLIGRAM(S): at 11:07

## 2024-12-02 RX ADMIN — Medication 250 MILLIGRAM(S): at 11:09

## 2024-12-02 RX ADMIN — MEMANTINE HYDROCHLORIDE 5 MILLIGRAM(S): 14 CAPSULE, EXTENDED RELEASE ORAL at 05:01

## 2024-12-02 RX ADMIN — TRAZODONE HYDROCHLORIDE 100 MILLIGRAM(S): 150 TABLET ORAL at 21:08

## 2024-12-02 RX ADMIN — ACETAMINOPHEN, DIPHENHYDRAMINE HCL, PHENYLEPHRINE HCL 5 MILLIGRAM(S): 325; 25; 5 TABLET ORAL at 21:08

## 2024-12-02 RX ADMIN — Medication 1 TABLET(S): at 11:06

## 2024-12-02 NOTE — CONSULT NOTE ADULT - ASSESSMENT
79 M w/ PMHx of dementia, HTN, alcohol abuse, alcohol withdrawal seizure, multiple recent admissions for fall/syncope, seizure episode was brought from the  [facility] for evaluation of the seizure episode. At this time, collateral is poor unclear if this is a withdrawal seizure given alcohol use disorder, triggered by infection (?UTI), or less likely primary seizure disorder.    Recommendations:  Follow up rEEG  No AEDS   C/w thiamine and folic acid  Delirium precautions  Seizure precautions    Discussed with Dr Yen

## 2024-12-02 NOTE — PROGRESS NOTE ADULT - ASSESSMENT
79 M w/ PMHx of dementia, HTN, alcohol abuse, alcohol withdrawal seizure, multiple recent admissions for fall/syncope, seizure episode was brought from the  [facility] for evaluation of the seizure episode.    #Witnessed seizure episodes, perhaps from sepsis?  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 She doesn't have much detail. Noted cousin in [place] Janett Dawson [phone]. Reports "heavy alcohol and drug use" in his younger years with multiple substances including possibly IVDU, but does not recall specifics.  - called [place] for more collateral, no answer will try again in PM    #Sepsis 2/2 ?Acute UTI  - UA unremarkable, 10WBC, trace LE  - s/p 2300ml NS in ed  - sp vancomycin and cefepime in ed   - c/w IV CTX for now  - f/u Bcx, ucx    #HFpEF  #mild pHTN   #CKD 3  - last TTE 06/24: G2DD w/ mild pHTN, basal septal hypertrophy  - xray chest-- mild pulm vascular congestion  - avoid aggressive iv fluids  - adjust meds per GFR  - strict in/output record    #HTN  #HLD  - cw nifedipine 60mg qd  - c/w rosuvastatin 20mg qhs     #Anxiety  #Mood disorder  - cw Depakote 250mg qd   - cw buspirone 15mg BID    ---------------------  DVT ppx: lovenox   Diet: CCC/DASH  GI ppx/Bowel regimen: not indicated  Activity: IAT  GOC: full code  Dispo: pending  #Summary/Handoff:  - f/u neuro, med rec, cultures   79 M w/ PMHx of dementia, HTN, alcohol abuse, alcohol withdrawal seizure, multiple recent admissions for fall/syncope, seizure episode was brought from the  [facility] for evaluation of the seizure episode.    #Witnessed seizure episodes?  #Retrograde amnesia, suspected possible wernicke/korsakoff   #Hx Dementia  #Hx AUD w/ withdrawal seizure  #Hx HARIKA (multiple substances when he was younger per emergency contact Jessica)  - CTH (-), JOVITA (-)  - c/w CIWA PRN   - c/w memantine 5mg BID, donepizil 5mg qd  - c/w trazodone 100mg qhs  - Thiamine, folic acid   - seizure precautions  - fu EEG  - fu Urine drug screen, PETH  - f/u neurology consult  - f/u CATCH team consult  - per emergency contact Jessica (wife of friend of brother who visits him occasionally), patient has had issues with short term memory for awhile but is usually aware of his surroundings and time, and can recognize her. She doesn't have much detail. Noted cousin in [place] Janett Dawson [phone]. Reports "heavy alcohol and drug use" in his younger years with multiple substances including possibly IVDU, but does not recall specifics.  - called [place] for more collateral, no answer will try again in PM    #Sepsis 2/2 ?Acute UTI  - UA 10WBC, trace LE  - sp vancomycin and cefepime in ed   - c/w IV CTX for now  - f/u Bcx, ucx    #HFpEF  #mild pHTN   #CKD 3  - last TTE 06/24: G2DD w/ mild pHTN, basal septal hypertrophy  - xray chest-- mild pulm vascular congestion  - avoid aggressive iv fluids  - adjust meds per GFR  - strict in/output record    #HTN  #HLD  - cw nifedipine 60mg qd  - c/w rosuvastatin 20mg qhs     #Anxiety  #Mood disorder  - cw Depakote 250mg qd   - cw buspirone 15mg BID    ---------------------  DVT ppx: lovenox   Diet: CCC/DASH  GI ppx/Bowel regimen: not indicated  Activity: IAT  GOC: full code  Dispo: from [facility]   #Summary/Handoff:  - f/u neuro, med rec, cultures, collateral from facility, EEG

## 2024-12-02 NOTE — PROGRESS NOTE ADULT - SUBJECTIVE AND OBJECTIVE BOX
Patient is a 79y old Male who presents with a chief complaint of seizure (01 Dec 2024 21:15)    Today is hospital day     Overnight events/Interval History:  - No acute overnight events  - At bedside, patient is A&Ox3, knows he is in hospital but not [place]. After frequent reorientation, patient asks same question repeatedly. Patient has been sleeping and eating well. Breathing comfortably on room air. Denies fevers, chills, SOB, chest pain, N/V/D/C, abdominal pain.    ROS:  - All ROS is negative unless indicated in HPI    Code Status:    ALLERGIES:  No Known Allergies    MEDICATIONS:  STANDING MEDICATIONS  busPIRone 15 milliGRAM(s) Oral two times a day  cefTRIAXone   IVPB 2000 milliGRAM(s) IV Intermittent every 24 hours  divalproex  milliGRAM(s) Oral daily  donepezil 5 milliGRAM(s) Oral at bedtime  enoxaparin Injectable 40 milliGRAM(s) SubCutaneous every 24 hours  folic acid 1 milliGRAM(s) Oral daily  melatonin 5 milliGRAM(s) Oral at bedtime  memantine 5 milliGRAM(s) Oral two times a day  metoprolol tartrate 25 milliGRAM(s) Oral two times a day  multivitamin 1 Tablet(s) Oral daily  NIFEdipine XL 60 milliGRAM(s) Oral daily  rosuvastatin 20 milliGRAM(s) Oral at bedtime  thiamine 100 milliGRAM(s) Oral daily  traZODone 100 milliGRAM(s) Oral at bedtime    PRN MEDICATIONS  acetaminophen     Tablet .. 650 milliGRAM(s) Oral every 6 hours PRN  LORazepam   Injectable 1 milliGRAM(s) IV Push every 2 hours PRN  LORazepam   Injectable 1 milliGRAM(s) IV Push every 1 hour PRN      VITALS LAST 24H:  Vital Signs Last 24 Hrs  T(C): 36.3 (02 Dec 2024 07:00), Max: 38 (01 Dec 2024 17:50)  T(F): 97.4 (02 Dec 2024 07:00), Max: 100.4 (01 Dec 2024 17:50)  HR: 73 (02 Dec 2024 07:00) (73 - 86)  BP: 108/58 (02 Dec 2024 07:00) (108/58 - 154/70)  BP(mean): --  RR: 19 (02 Dec 2024 07:00) (18 - 19)  SpO2: 96% (01 Dec 2024 19:50) (95% - 96%)    Parameters below as of 01 Dec 2024 14:45  Patient On (Oxygen Delivery Method): room air        PHYSICAL EXAM:  GENERAL: NAD, lying in bed comfortably  HEENT:  EOMI, Moist mucous membranes  CHEST/LUNG: Clear to auscultation bilaterally; normal respiratory effort, no coughing, wheezes, crackles, or rales.  HEART: Regular rate and rhythm  ABDOMEN: Soft, nontender, nondistended, Bowel sounds present  EXTREMITIES:  2+ Peripheral Pulses, brisk capillary refill. No lower extremity edema, clubbing, cyanosis bilaterally  NERVOUS SYSTEM:  A&Ox3, no focal deficits, +retrograde amnesia  SKIN: No rashes or lesions    LABS:                        10.9   20.46 )-----------( 247      ( 01 Dec 2024 16:56 )             32.1     12-01    133[L]  |  101  |  27[H]  ----------------------------<  120[H]  4.0   |  24  |  1.1    Ca    8.6      01 Dec 2024 23:30  Phos  2.9     12-01  Mg     2.1     12-01    TPro  5.5[L]  /  Alb  3.4[L]  /  TBili  0.5  /  DBili  0.2  /  AST  26  /  ALT  11  /  AlkPhos  85  12-01      Urinalysis Basic - ( 01 Dec 2024 23:30 )    Color: x / Appearance: x / SG: x / pH: x  Gluc: 120 mg/dL / Ketone: x  / Bili: x / Urobili: x   Blood: x / Protein: x / Nitrite: x   Leuk Esterase: x / RBC: x / WBC x   Sq Epi: x / Non Sq Epi: x / Bacteria: x        Lactate, Blood: 1.4 mmol/L (12-01-24 @ 17:38)      Urinalysis with Rflx Culture (collected 01 Dec 2024 18:40)          RADIOLOGY:  CXR      CT

## 2024-12-02 NOTE — PHYSICAL THERAPY INITIAL EVALUATION ADULT - PLANNED THERAPY INTERVENTIONS, PT EVAL
Pt Name: Reyna Cisneros  MRN: [id number]  Birthdate 1988    Provider: MILAD Monroy CNP  PCP: No primary care provider on file.    CHIEF COMPLAINT       Chief Complaint   Patient presents with    Ear Fullness     Feels like her ear is full and has been going on for a couple weeks        HISTORY OF PRESENT ILLNESS:      History From: Pt     Chief Complaint: above    Reyna Cisneros (: 1988) is a 35 y.o. female, New patient, here for evaluation of the following chief complaint(s):  Ear Fullness (Feels like her ear is full and has been going on for a couple weeks )    Rht side impacted.  H/o same in left side  Tried multiple home options w/o relief  NO fevers chills or other infection s/s      ASSESSMENT/PLAN:       ICD-10-CM    1. Impacted cerumen of right ear  H61.21         34 yo presents for cerumen impaction     Vital signs have been reviewed and are stable. Patient is afebrile, nontoxic appearing and in no acute distress. There is no evidence of mastoiditis or other significant infection. On exam, the patient’s right ear is impacted with cerumen. Feel this is the source of their symptoms.     After removal of cerumen with warm water and irrigation, the patient feels improved. There is no significant evidence of otitis media or otitis externa. TM ‘s are intact. We discussed proper ear care, the diagnosis, follow-up plan, and return precautions. All questions were answered.     The patient tolerated their visit well.  The patient and / or the family were informed of the results of any tests, a time was given to answer questions, a plan was proposed and they agreed with plan.    REVIEW OF SYSTEMS :      Positives and Pertinent negatives as per HPI.     SURGICAL HISTORY   No past surgical history on file.      ALLERGIES     Patient has no known allergies.    FAMILYHISTORY     No family history on file.     SOCIAL HISTORY       Social History     Tobacco Use    Smoking status: Never     No PT intervention needed. Patient independent in ADLs and ambulation no AD. Contact PT if status changes

## 2024-12-02 NOTE — CONSULT NOTE ADULT - SUBJECTIVE AND OBJECTIVE BOX
NEUROLOGY CONSULT    HPI:  79 M w/ PMHx of dementia, HTN, alcohol abuse, alcohol withdrawal seizure, multiple recent admissions for fall/syncope, seizure episode was brought from the  [facility] for evaluation of the seizure episode. Patient is aox3 at time of the interview. Patient states that he is completely fine and does not know the reason why he was brought to the ed. He further states that he want to go home, as he does not have any illness. Called the Facility multiple times via phone call  and spoke to Desi. She states that she does not have any information why patient was brought to the ed and recommended to call in am after 10 o clock.  Hx taken from the chart. On chart review, Patient had seizure while on bed today witnessed by staff at assisted living , no falls in injuries.Pt reportedly had episode where his whole body was shaking and eyes had rolled back. Episode was reportedly brief. On review of the systems, he denies any headache, dizziness, loss of consciousness chest pain, shortness of breath, palpitations nausea, vomit, diarrhea, constipation, dysuria, frequency, urgency, extremity weakness , and other significant complaints..     ED VITALS    · BP Systolic	154 mm Hg  · BP Diastolic	70 mm Hg  · Heart Rate	84 /min  · Respiration Rate (breaths/min)	18 /min  · Temp (F)	97 Degrees F  · Temp (C)	36.1 Degrees C  · Temp site	oral  · SpO2 (%)	95 %  · O2 Delivery/Oxygen Delivery Method	room air  · Temp at ED Arrival (C)	36.1 Degrees C      LABS    wbc 20k, hb 10, trops 47,46, gfr 56, urine + wbc, leukocyte esterase, rvp -ve,     Imaging    ct head: no acute pathology    xray chest: mild pulm vascular congestion     ekg: normal sinus rythym, no acute t wave and st segment changes          (01 Dec 2024 21:15)     MEDICATIONS  Home Medications:  busPIRone 15 mg oral tablet: 1 tab(s) orally 2 times a day (01 Dec 2024 21:02)  cholecalciferol: 125 microgram(s) orally once a day (01 Dec 2024 21:06)  Depakote  mg oral tablet, extended release: 1 tab(s) orally once a day (01 Dec 2024 21:07)  donepezil 5 mg oral tablet: 1 tab(s) orally once a day (at bedtime) (01 Dec 2024 21:07)  folic acid: 1 milligram(s) orally once a day (01 Dec 2024 21:07)  magnesium gluconate: 400 milligram(s) orally once a day (at bedtime) (01 Dec 2024 21:07)  melatonin 5 mg oral tablet: 1 tab(s) orally once a day (at bedtime) (01 Dec 2024 21:07)  memantine 5 mg oral tablet: 1 tab(s) orally 2 times a day (01 Dec 2024 21:03)  metoprolol tartrate 25 mg oral tablet: 1 tab(s) orally 2 times a day (01 Dec 2024 21:07)  NIFEdipine 60 mg oral tablet, extended release: 1 tab(s) orally once a day (01 Dec 2024 21:07)  rosuvastatin 20 mg oral tablet: 1 tab(s) orally once a day (at bedtime) (01 Dec 2024 21:07)  traZODone 50 mg oral tablet: 2 tab(s) orally once a day (at bedtime) (01 Dec 2024 21:05)  Vitron-C 125 mg-65 mg oral tablet: 1 tab(s) orally once a day (01 Dec 2024 21:07)  Vitron-C 125 mg-65 mg oral tablet: 1 tab(s) orally once a day (01 Dec 2024 21:07)    MEDICATIONS  (STANDING):  busPIRone 15 milliGRAM(s) Oral two times a day  cefTRIAXone   IVPB 2000 milliGRAM(s) IV Intermittent every 24 hours  divalproex  milliGRAM(s) Oral daily  donepezil 5 milliGRAM(s) Oral at bedtime  enoxaparin Injectable 40 milliGRAM(s) SubCutaneous every 24 hours  folic acid 1 milliGRAM(s) Oral daily  melatonin 5 milliGRAM(s) Oral at bedtime  memantine 5 milliGRAM(s) Oral two times a day  metoprolol tartrate 25 milliGRAM(s) Oral two times a day  multivitamin 1 Tablet(s) Oral daily  NIFEdipine XL 60 milliGRAM(s) Oral daily  rosuvastatin 20 milliGRAM(s) Oral at bedtime  thiamine 100 milliGRAM(s) Oral daily  traZODone 100 milliGRAM(s) Oral at bedtime    MEDICATIONS  (PRN):  acetaminophen     Tablet .. 650 milliGRAM(s) Oral every 6 hours PRN Temp greater or equal to 38C (100.4F), Mild Pain (1 - 3)  LORazepam   Injectable 1 milliGRAM(s) IV Push every 2 hours PRN CIWA-Ar score increase by 2 points and a total score of 7 or less  LORazepam   Injectable 1 milliGRAM(s) IV Push every 1 hour PRN CIWA-Ar score 8 or greater      FAMILY HISTORY:    SOCIAL HISTORY: negative for tobacco, alcohol, or ilicit drug use.    Allergies    No Known Allergies    Intolerances        Physical exam:  Constitutional: No acute distress, conversant  Eyes: Anicteric sclerae, moist conjunctivae, see below for CNs  Neck: trachea midline, FROM, supple, no thyromegaly or lymphadenopathy  Cardiovascular: Regular rate and rhythm, no murmurs, rubs, or gallops. No carotid bruits.   Pulmonary: Anterior breath sounds clear bilaterally, no crackles or wheezing. No use of accessory muscles  GI: Abdomen soft, non-distended, non-tender  Extremities: Radial and DP pulses +2, no edema    Neurologic:  -Mental status: Awake, alert, oriented to person, place, and time. Speech is fluent with intact naming, repetition, and comprehension, no dysarthria. Recent and remote memory intact. Follows commands. Attention/concentration intact. Fund of knowledge appropriate.  -Cranial nerves:   II: Visual fields are full to confrontation.  III, IV, VI: Extraocular movements are intact without nystagmus. Pupils equally round and reactive to light  V:  Facial sensation V1-V3 equal and intact   VII: Face is symmetric with normal eye closure and smile  VIII: Hearing is bilaterally intact to finger rub  IX, X: Uvula is midline and soft palate rises symmetrically  XI: Head turning and shoulder shrug are intact.  XII: Tongue protrudes midline  Motor: Normal bulk and tone. No pronator drift. Strength bilateral upper extremity 5/5, bilateral lower extremities 5/5.  Rapid alternating movements intact and symmetric  Sensation: Intact to light touch bilaterally. No neglect or extinction on double simultaneous testing.  Coordination: No dysmetria on finger-to-nose and heel-to-shin bilaterally  Reflexes: Downgoing toes bilaterally   Gait: Narrow gait and steady    NIHSS: **** ASPECT Score: ***** ICH Score: ****** (GCS)    LABS:                        10.9   20.46 )-----------( 247      ( 01 Dec 2024 16:56 )             32.1     12-01    133[L]  |  101  |  27[H]  ----------------------------<  120[H]  4.0   |  24  |  1.1    Ca    8.6      01 Dec 2024 23:30  Phos  2.9     12-01  Mg     2.1     12-01    TPro  5.5[L]  /  Alb  3.4[L]  /  TBili  0.5  /  DBili  0.2  /  AST  26  /  ALT  11  /  AlkPhos  85  12-01    Hemoglobin A1C:   Vitamin B12     CAPILLARY BLOOD GLUCOSE          Urinalysis Basic - ( 01 Dec 2024 23:30 )    Color: x / Appearance: x / SG: x / pH: x  Gluc: 120 mg/dL / Ketone: x  / Bili: x / Urobili: x   Blood: x / Protein: x / Nitrite: x   Leuk Esterase: x / RBC: x / WBC x   Sq Epi: x / Non Sq Epi: x / Bacteria: x            Microbiology:    Urinalysis with Rflx Culture (collected 01 Dec 2024 18:40)        RADIOLOGY, EKG AND ADDITIONAL TESTS: Reviewed.           NEUROLOGY CONSULT    HPI:  79 M w/ PMHx of dementia, HTN, alcohol abuse, alcohol withdrawal seizure, multiple recent admissions for fall/syncope, seizure episode was brought from the  [facility] for evaluation of the seizure episode. Patient is aox3 at time of the interview. Patient states that he is completely fine and does not know the reason why he was brought to the ed. Per chart review "patient had seizure while on bed today witnessed by staff at assisted living. He had an episode where his whole body was shaking and eyes had rolled back. Episode was reportedly brief. "    On speaking with the patient he says he does not remember what brought him in, though his memory is usually very poor. He does not recall where he came from or where he is currently living. He says he does not have any living family that would be able to provide specific collateral. He admits to drinking 5 drinks of bourbon daily but against chart review denies alcohol seizures. I called the nursing home who say that it is possible that he could obtain alcohol at the facility but they do not know for certain if he is. They tell me that no clinical staff witnessed the event so they cannot provide further details on the incident. He admits to alcohol and marijuna. Per chart review has had IVDU in the past.          (01 Dec 2024 21:15)     MEDICATIONS  Home Medications:  busPIRone 15 mg oral tablet: 1 tab(s) orally 2 times a day (01 Dec 2024 21:02)  cholecalciferol: 125 microgram(s) orally once a day (01 Dec 2024 21:06)  Depakote  mg oral tablet, extended release: 1 tab(s) orally once a day (01 Dec 2024 21:07)  donepezil 5 mg oral tablet: 1 tab(s) orally once a day (at bedtime) (01 Dec 2024 21:07)  folic acid: 1 milligram(s) orally once a day (01 Dec 2024 21:07)  magnesium gluconate: 400 milligram(s) orally once a day (at bedtime) (01 Dec 2024 21:07)  melatonin 5 mg oral tablet: 1 tab(s) orally once a day (at bedtime) (01 Dec 2024 21:07)  memantine 5 mg oral tablet: 1 tab(s) orally 2 times a day (01 Dec 2024 21:03)  metoprolol tartrate 25 mg oral tablet: 1 tab(s) orally 2 times a day (01 Dec 2024 21:07)  NIFEdipine 60 mg oral tablet, extended release: 1 tab(s) orally once a day (01 Dec 2024 21:07)  rosuvastatin 20 mg oral tablet: 1 tab(s) orally once a day (at bedtime) (01 Dec 2024 21:07)  traZODone 50 mg oral tablet: 2 tab(s) orally once a day (at bedtime) (01 Dec 2024 21:05)  Vitron-C 125 mg-65 mg oral tablet: 1 tab(s) orally once a day (01 Dec 2024 21:07)  Vitron-C 125 mg-65 mg oral tablet: 1 tab(s) orally once a day (01 Dec 2024 21:07)    MEDICATIONS  (STANDING):  busPIRone 15 milliGRAM(s) Oral two times a day  cefTRIAXone   IVPB 2000 milliGRAM(s) IV Intermittent every 24 hours  divalproex  milliGRAM(s) Oral daily  donepezil 5 milliGRAM(s) Oral at bedtime  enoxaparin Injectable 40 milliGRAM(s) SubCutaneous every 24 hours  folic acid 1 milliGRAM(s) Oral daily  melatonin 5 milliGRAM(s) Oral at bedtime  memantine 5 milliGRAM(s) Oral two times a day  metoprolol tartrate 25 milliGRAM(s) Oral two times a day  multivitamin 1 Tablet(s) Oral daily  NIFEdipine XL 60 milliGRAM(s) Oral daily  rosuvastatin 20 milliGRAM(s) Oral at bedtime  thiamine 100 milliGRAM(s) Oral daily  traZODone 100 milliGRAM(s) Oral at bedtime    MEDICATIONS  (PRN):  acetaminophen     Tablet .. 650 milliGRAM(s) Oral every 6 hours PRN Temp greater or equal to 38C (100.4F), Mild Pain (1 - 3)  LORazepam   Injectable 1 milliGRAM(s) IV Push every 2 hours PRN CIWA-Ar score increase by 2 points and a total score of 7 or less  LORazepam   Injectable 1 milliGRAM(s) IV Push every 1 hour PRN CIWA-Ar score 8 or greater      FAMILY HISTORY:    SOCIAL HISTORY: negative for tobacco, alcohol, or ilicit drug use.    Allergies    No Known Allergies    Intolerances        Neurologic:  -Mental status: Awake, alert, oriented to person, place, and time (month and year). Speech is fluent with intact naming, repetition, and comprehension, no dysarthria.Follows commands. Attention/concentration intact.  -Cranial nerves:   II: Visual fields are full to confrontation.  III, IV, VI: Extraocular movements are intact without nystagmus. Pupils equally round and reactive to light  V:  Facial sensation V1-V3 equal and intact   VII: Face is symmetric with normal eye closure and smile  VIII: Hearing is bilaterally intact to finger rub  IX, X: Uvula is midline and soft palate rises symmetrically  XI: Head turning and shoulder shrug are intact.  XII: Tongue protrudes midline  Motor: Normal bulk and tone. No pronator drift. Strength bilateral upper extremity 5/5, bilateral lower extremities 5/5.  Rapid alternating movements intact and symmetric  Sensation: Intact to light touch bilaterally. No neglect or extinction on double simultaneous testing.  Coordination: No dysmetria on finger-to-nose  bilaterally  Reflexes: Downgoing toes bilaterally   Gait: deferred    LABS:                        10.9   20.46 )-----------( 247      ( 01 Dec 2024 16:56 )             32.1     12-01    133[L]  |  101  |  27[H]  ----------------------------<  120[H]  4.0   |  24  |  1.1    Ca    8.6      01 Dec 2024 23:30  Phos  2.9     12-01  Mg     2.1     12-01    TPro  5.5[L]  /  Alb  3.4[L]  /  TBili  0.5  /  DBili  0.2  /  AST  26  /  ALT  11  /  AlkPhos  85  12-01    Hemoglobin A1C:   Vitamin B12     CAPILLARY BLOOD GLUCOSE          Urinalysis Basic - ( 01 Dec 2024 23:30 )    Color: x / Appearance: x / SG: x / pH: x  Gluc: 120 mg/dL / Ketone: x  / Bili: x / Urobili: x   Blood: x / Protein: x / Nitrite: x   Leuk Esterase: x / RBC: x / WBC x   Sq Epi: x / Non Sq Epi: x / Bacteria: x            Microbiology:    Urinalysis with Rflx Culture (collected 01 Dec 2024 18:40)        RADIOLOGY, EKG AND ADDITIONAL TESTS: Reviewed.

## 2024-12-02 NOTE — CHART NOTE - NSCHARTNOTEFT_GEN_A_CORE
Med rec confirmed with [facility] (papers faxed and in chart)    - divalproex sodium 250mg ER qd  - folic acid 1mg qd   - metoprolol tartrate 25mg BID   - nifedipine 60mg XL qd  - iron 65mg elemental qd  - cholecalciferol 5000U qd   - MVI qd   -memantine 5mg BID  -buspirone 15mg BID Med rec confirmed with [facility] (papers faxed and in chart)    - divalproex sodium 250mg ER qd  - folic acid 1mg qd   - metoprolol tartrate 25mg BID   - nifedipine 60mg XL qd  - iron 65mg elemental qd  - cholecalciferol 5000U qd   - MVI qd   - memantine 5mg BID  - buspirone 15mg BID  - magnesium glycinate 400mg qhs  - donepezil 5mg qhs  - melatonin 5mg qhs  - rosuvastatin 20mg qd  - trazodone 100mg qhs

## 2024-12-02 NOTE — PHYSICAL THERAPY INITIAL EVALUATION ADULT - ADDITIONAL COMMENTS
Patient lives in [email or web address] Was independent in ADLs and ambulation no AD.

## 2024-12-02 NOTE — PHYSICAL THERAPY INITIAL EVALUATION ADULT - PERTINENT HX OF CURRENT PROBLEM, REHAB EVAL
79 M w/ PMHx of dementia, HTN, alcohol abuse, alcohol withdrawal seizure, multiple recent admissions for fall/syncope, seizure episode was brought from the  [facility] for evaluation of the seizure episode. Patient is aox3 at time of the interview. Patient states that he is completely fine and does not know the reason why he was brought to the ed. He further states that he want to go home, as he does not have any illness. Called the Facility multiple times via phone call  and spoke to Desi. She states that she does not have any information why patient was brought to the ed and recommended to call in am after 10 o clock.  Hx taken from the chart. On chart review, Patient had seizure while on bed today witnessed by staff at assisted living , no falls in injuries. Pt reportedly had episode where his whole body was shaking and eyes had rolled back. Episode was reportedly brief. On review of the systems, he denies any headache, dizziness, loss of consciousness chest pain, shortness of breath, palpitations nausea, vomit, diarrhea, constipation, dysuria, frequency, urgency, extremity weakness , and other significant complaints..

## 2024-12-03 LAB
CULTURE RESULTS: NO GROWTH — SIGNIFICANT CHANGE UP
HCT VFR BLD CALC: 27 % — LOW (ref 42–52)
HCT VFR BLD CALC: 27.4 % — LOW (ref 42–52)
HGB BLD-MCNC: 8.9 G/DL — LOW (ref 14–18)
HGB BLD-MCNC: 9.1 G/DL — LOW (ref 14–18)
MCHC RBC-ENTMCNC: 32 PG — HIGH (ref 27–31)
MCHC RBC-ENTMCNC: 32.1 PG — HIGH (ref 27–31)
MCHC RBC-ENTMCNC: 33 G/DL — SIGNIFICANT CHANGE UP (ref 32–37)
MCHC RBC-ENTMCNC: 33.2 G/DL — SIGNIFICANT CHANGE UP (ref 32–37)
MCV RBC AUTO: 96.5 FL — HIGH (ref 80–94)
MCV RBC AUTO: 97.5 FL — HIGH (ref 80–94)
NRBC # BLD: 0 /100 WBCS — SIGNIFICANT CHANGE UP (ref 0–0)
NRBC # BLD: 0 /100 WBCS — SIGNIFICANT CHANGE UP (ref 0–0)
PLATELET # BLD AUTO: 195 K/UL — SIGNIFICANT CHANGE UP (ref 130–400)
PLATELET # BLD AUTO: 197 K/UL — SIGNIFICANT CHANGE UP (ref 130–400)
PMV BLD: 11.3 FL — HIGH (ref 7.4–10.4)
PMV BLD: 11.6 FL — HIGH (ref 7.4–10.4)
RBC # BLD: 2.77 M/UL — LOW (ref 4.7–6.1)
RBC # BLD: 2.84 M/UL — LOW (ref 4.7–6.1)
RBC # FLD: 13.1 % — SIGNIFICANT CHANGE UP (ref 11.5–14.5)
RBC # FLD: 13.2 % — SIGNIFICANT CHANGE UP (ref 11.5–14.5)
SPECIMEN SOURCE: SIGNIFICANT CHANGE UP
WBC # BLD: 10.5 K/UL — SIGNIFICANT CHANGE UP (ref 4.8–10.8)
WBC # BLD: 10.89 K/UL — HIGH (ref 4.8–10.8)
WBC # FLD AUTO: 10.5 K/UL — SIGNIFICANT CHANGE UP (ref 4.8–10.8)
WBC # FLD AUTO: 10.89 K/UL — HIGH (ref 4.8–10.8)

## 2024-12-03 PROCEDURE — 99222 1ST HOSP IP/OBS MODERATE 55: CPT

## 2024-12-03 PROCEDURE — 95816 EEG AWAKE AND DROWSY: CPT | Mod: 26

## 2024-12-03 PROCEDURE — 99232 SBSQ HOSP IP/OBS MODERATE 35: CPT

## 2024-12-03 RX ORDER — LORAZEPAM 2 MG/1
2 TABLET ORAL ONCE
Refills: 0 | Status: DISCONTINUED | OUTPATIENT
Start: 2024-12-03 | End: 2024-12-03

## 2024-12-03 RX ORDER — HALOPERIDOL 2 MG
5 TABLET ORAL ONCE
Refills: 0 | Status: DISCONTINUED | OUTPATIENT
Start: 2024-12-03 | End: 2024-12-03

## 2024-12-03 RX ORDER — LORAZEPAM 2 MG/1
2 TABLET ORAL ONCE
Refills: 0 | Status: COMPLETED | OUTPATIENT
Start: 2024-12-03 | End: 2024-12-03

## 2024-12-03 RX ADMIN — Medication 15 MILLIGRAM(S): at 05:39

## 2024-12-03 RX ADMIN — ENOXAPARIN SODIUM 40 MILLIGRAM(S): 30 INJECTION SUBCUTANEOUS at 11:42

## 2024-12-03 RX ADMIN — TRAZODONE HYDROCHLORIDE 100 MILLIGRAM(S): 150 TABLET ORAL at 21:05

## 2024-12-03 RX ADMIN — ROSUVASTATIN CALCIUM 20 MILLIGRAM(S): 5 TABLET, FILM COATED ORAL at 21:04

## 2024-12-03 RX ADMIN — Medication 250 MILLIGRAM(S): at 11:46

## 2024-12-03 RX ADMIN — Medication 100 MILLIGRAM(S): at 11:42

## 2024-12-03 RX ADMIN — METOPROLOL TARTRATE 25 MILLIGRAM(S): 100 TABLET, FILM COATED ORAL at 21:04

## 2024-12-03 RX ADMIN — Medication 1 TABLET(S): at 11:46

## 2024-12-03 RX ADMIN — MEMANTINE HYDROCHLORIDE 5 MILLIGRAM(S): 14 CAPSULE, EXTENDED RELEASE ORAL at 05:39

## 2024-12-03 RX ADMIN — DONEPEZIL HYDROCHLORIDE 5 MILLIGRAM(S): 5 TABLET, FILM COATED ORAL at 21:05

## 2024-12-03 RX ADMIN — Medication 1 MILLIGRAM(S): at 11:47

## 2024-12-03 RX ADMIN — MEMANTINE HYDROCHLORIDE 5 MILLIGRAM(S): 14 CAPSULE, EXTENDED RELEASE ORAL at 18:00

## 2024-12-03 RX ADMIN — LORAZEPAM 2 MILLIGRAM(S): 2 TABLET ORAL at 00:34

## 2024-12-03 RX ADMIN — METOPROLOL TARTRATE 25 MILLIGRAM(S): 100 TABLET, FILM COATED ORAL at 05:39

## 2024-12-03 RX ADMIN — Medication 15 MILLIGRAM(S): at 18:00

## 2024-12-03 RX ADMIN — ACETAMINOPHEN, DIPHENHYDRAMINE HCL, PHENYLEPHRINE HCL 5 MILLIGRAM(S): 325; 25; 5 TABLET ORAL at 21:04

## 2024-12-03 RX ADMIN — Medication 60 MILLIGRAM(S): at 05:39

## 2024-12-03 NOTE — PROGRESS NOTE ADULT - ASSESSMENT
79 M w/ PMHx of dementia, HTN, alcohol abuse, alcohol withdrawal seizure, multiple recent admissions for fall/syncope, seizure episode was brought from the  [facility] for evaluation of the seizure episode.    #Witnessed seizure episodes?  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 She doesn't have much detail. Noted cousin in [place] Janett Dawson [phone]. Reports "heavy alcohol and drug use" in his younger years with multiple substances including possibly IVDU, but does not recall specifics.  - called [place] for more collateral, no answer will try again in PM  - CTH (-), JOVITA (-)  - c/w CIWA PRN   - c/w memantine 5mg BID, donepizil 5mg qd  - c/w trazodone 100mg qhs  - continue with Thiamine, folic acid   - fu EEG- No electrographic seizures or significant clinical events. Findings consistent with mild diffuse electrocerebral dysfunction secondary to nonspecific etiology  - fu Urine drug screen, PETH  - f/u neurology consult  - f/u CATCH team consult  - delirium precaution  - seizure precautions    #Sepsis 2/2 ?Acute UTI  - UA 10WBC, trace LE  - sp vancomycin and cefepime in ed   - c/w IV CTX for now  - f/u Bcx 12/1 negative,f/u  ucx    #HFpEF  #mild pHTN   #CKD 3  - last TTE 06/24: G2DD w/ mild pHTN, basal septal hypertrophy  - xray chest-- mild pulm vascular congestion  - avoid aggressive iv fluids  - adjust meds per GFR  - strict in/output record    #HTN  #HLD  - cw nifedipine 60mg qd  - c/w rosuvastatin 20mg qhs     #Anxiety  #Mood disorder  - cw Depakote 250mg qd   - cw buspirone 15mg BID    ---------------------  DVT ppx: lovenox   Diet: CCC/DASH  GI ppx/Bowel regimen: not indicated  Activity: IAT  GOC: full code  Dispo: from [facility]   #Summary/Handoff:    - f/u neuro, urine cultures, cbc     79 M w/ PMHx of dementia, HTN, alcohol abuse, alcohol withdrawal seizure, multiple recent admissions for fall/syncope, seizure episode was brought from the  [facility] for evaluation of the seizure episode.    #Witnessed seizure-like episode?> no clear history and neg EEG  #Retrograde amnesia?possible wernicke/korsakoff   #Hx Dementia  #Hx AUD w/ withdrawal seizure  #Hx HARIKA (multiple substances when he was younger per emergency contact Jessica)  - per emergency contact Jessica (wife of friend of brother who visits him occasionally), patient has had issues with short term memory for awhile but is usually aware of his surroundings and time, and can recognize her. She doesn't have much detail. Noted cousin in [place] Janett Dawson [phone]. Reports "heavy alcohol and drug use" in his younger years with multiple substances including possibly IVDU, but does not recall specifics.  - called [facility] for more collateral, only person who witnessed event was apparently a home health aide, no other staff available to give specific details of event, unclear history of what actually happened   - CTH (-), JOVITA (-)  - c/w CIWA PRN   - c/w memantine 5mg BID, donepizil 5mg qd  - c/w trazodone 100mg qhs  - continue with Thiamine, folic acid   - fu EEG- No electrographic seizures or significant clinical events. Findings consistent with mild diffuse electrocerebral dysfunction secondary to nonspecific etiology  - urine drug screen positive for THC  - neurology consult>rEEG   - delirium precaution  - seizure precautions    #Sepsis 2/2 Acute UTI  - c/w IV CTX for now  - f/u Bcx 12/1 negativ  -f/u  ucx    #Acute anemia?  -Hgb now 8.9  -recheck cbc, no signs of bleeding     #HFpEF  #mild pHTN   #CKD 3  - last TTE 06/24: G2DD w/ mild pHTN, basal septal hypertrophy  - xray chest-- mild pulm vascular congestion  - avoid aggressive iv fluids  - adjust meds per GFR  - strict in/output record    #HTN  #HLD  - cw nifedipine 60mg qd  - c/w rosuvastatin 20mg qhs     #Anxiety  #Mood disorder  - cw Depakote 250mg qd   - cw buspirone 15mg BID    ---------------------  DVT ppx: lovenox   Diet: CCC/DASH  GI ppx/Bowel regimen: not indicated  Activity: IAT  GOC: full code  Dispo: from [facility]     #Summary/Handoff:  f/u urine culture, repeat cbc, anticipate      79 M w/ PMHx of dementia, HTN, alcohol abuse, alcohol withdrawal seizure, multiple recent admissions for fall/syncope, seizure episode was brought from the  [facility] for evaluation of the seizure episode.    #Witnessed seizure-like episode?> no clear history and neg EEG  #Retrograde amnesia?possible wernicke/korsakoff   #Hx Dementia  #Hx AUD w/ withdrawal seizure  #Hx HARIKA (multiple substances when he was younger per emergency contact Jessica)  - per emergency contact Jessica (wife of friend of brother who visits him occasionally), patient has had issues with short term memory for awhile but is usually aware of his surroundings and time, and can recognize her. She doesn't have much detail. Noted cousin in [place] Janett Dawson [phone]. Reports "heavy alcohol and drug use" in his younger years with multiple substances including possibly IVDU, but does not recall specifics.  - called [place] for more collateral, only person who witnessed event was apparently a home health aide, no other staff available to give specific details of event, unclear history of what actually happened   - CTH (-), JOVITA (-)  - c/w CIWA PRN   - c/w memantine 5mg BID, donepizil 5mg qd  - c/w trazodone 100mg qhs  - continue with Thiamine, folic acid   - fu EEG- No electrographic seizures or significant clinical events. Findings consistent with mild diffuse electrocerebral dysfunction secondary to nonspecific etiology  - urine drug screen positive for THC  - neurology consult>rEEG   - delirium precaution  - seizure precautions  -check orthostatics    #Sepsis 2/2 Acute UTI  - c/w IV CTX for now  - f/u Bcx 12/1 negativ  -f/u  ucx    #Acute anemia?  -Hgb now 8.9  -recheck cbc, no signs of bleeding     #HFpEF  #mild pHTN   #CKD 3  - last TTE 06/24: G2DD w/ mild pHTN, basal septal hypertrophy  - xray chest-- mild pulm vascular congestion  - avoid aggressive iv fluids  - adjust meds per GFR  - strict in/output record    #HTN  #HLD  - cw nifedipine 60mg qd  - c/w rosuvastatin 20mg qhs     #Anxiety  #Mood disorder  - cw Depakote 250mg qd   - cw buspirone 15mg BID    ---------------------  DVT ppx: lovenox   Diet: CCC/DASH  GI ppx/Bowel regimen: not indicated  Activity: IAT  GOC: full code  Dispo: from [facility]     #Summary/Handoff:  f/u urine culture, repeat cbc, check orthostatics, anticipate

## 2024-12-03 NOTE — EEG REPORT - NS EEG TEXT BOX
[facility]  Inpatient Routine-EEG Report      Patient Name:	RAFAL MONTEZ    :	1945  MRN:	-  Study Date/Time:	2024, 1:04:15 PM  Referred by:	-    Brief Clinical History:  RAFAL MONTEZ is a 79 year old -; study performed to investigate for seizures or markers of epilepsy.   Diagnosis Code: R40.4 Transient alteration of awareness    Patient Medication:  LOPRESSOR    BUSPAR    TYLENOL    MAXIPIME    ARICEPT    NAMENDA      Acquisition Details:  Electroencephalography was acquired using a minimum of 21 channels on an [employer] Neu[employer] system v 9.3.1 with electrode placement according to the standard International 10-20 system following ACNS (American Clinical Neurophysiology Society) guidelines.  Anterior temporal T1 and T2 electrodes were utilized whenever possible.   The [employer] automated spike & seizure detections were all reviewed in detail, in addition to the entire raw EEG.    Findings:  Background:  continuous.   Voltage:  Normal (20uV)  Organization:  Appropriate anterior-posterior gradient  Posterior Dominant Rhythm:  7-8 Hz symmetric, well-organized, and well-modulated  Variability:  Yes	Reactivity:  Yes  Sleep:  Absent.  Focal abnormalities:  No persistent asymmetries of voltage or frequency.  Interictal Activity:  None  Focal Slowing:  None  Generalized Slowing:  Mild  Events:  1)	No electrographic seizures or significant clinical events.  Provocations:  1)	Hyperventilation: was not performed.  2)	Photic stimulation: was not performed.  Impression:  Abnormal due to generalized slowing as above    Clinical Correlation:  Findings consistent with mild diffuse electrocerebral dysfunction secondary to nonspecific etiology    Celestina Evans MD  Attending Neurologist, Division of Epilepsy

## 2024-12-03 NOTE — PROVIDER CONTACT NOTE (OTHER) - RECOMMENDATIONS
Last vitals taken were stable. MD Hill was notified of refusal. Last vitals taken were stable. MD Hill was notified of refusal. Patient also due for PM metoprolol but is refusing we check his HR; MD Hill said to reschedule medication.

## 2024-12-03 NOTE — PROVIDER CONTACT NOTE (OTHER) - BACKGROUND
Patient is upset that his constant observation was discontinued and the bed alarm is going off whenever he gets up.

## 2024-12-03 NOTE — PROVIDER CONTACT NOTE (OTHER) - ASSESSMENT
Patient is AOx3-4. Patient was encouraged to let PCA take vitals multiple times but he is still refusing. Patient shows no signs of physical distress.

## 2024-12-03 NOTE — PROVIDER CONTACT NOTE (OTHER) - ACTION/TREATMENT ORDERED:
MD Hill is aware that routine vitals and orthostatic vitals were unable to be taken. MD Hill is aware that routine vitals and orthostatic vitals were unable to be taken. Metoprolol rescheduled to 8pm.

## 2024-12-03 NOTE — PROGRESS NOTE ADULT - SUBJECTIVE AND OBJECTIVE BOX
RAFAL MONTEZ 79y Male  MRN#: [id number]     Hospital Day: 2d    Pt is currently admitted with the primary diagnosis of  Sepsis        SUBJECTIVE     Overnight events  None    Subjective complaints  Pt was evaluated this am. Patient denied any active complaints and per patient his symptoms are improving                                            ----------------------------------------------------------  OBJECTIVE  PAST MEDICAL & SURGICAL HISTORY  HTN (hypertension)    Alcohol abuse                                              -----------------------------------------------------------  ALLERGIES:  No Known Allergies                                            ------------------------------------------------------------    HOME MEDICATIONS  Home Medications:  busPIRone 15 mg oral tablet: 1 tab(s) orally 2 times a day (01 Dec 2024 21:02)  cholecalciferol: 125 microgram(s) orally once a day (01 Dec 2024 21:06)  Depakote  mg oral tablet, extended release: 1 tab(s) orally once a day (01 Dec 2024 21:07)  donepezil 5 mg oral tablet: 1 tab(s) orally once a day (at bedtime) (01 Dec 2024 21:07)  folic acid: 1 milligram(s) orally once a day (01 Dec 2024 21:07)  magnesium gluconate: 400 milligram(s) orally once a day (at bedtime) (01 Dec 2024 21:07)  melatonin 5 mg oral tablet: 1 tab(s) orally once a day (at bedtime) (01 Dec 2024 21:07)  memantine 5 mg oral tablet: 1 tab(s) orally 2 times a day (01 Dec 2024 21:03)  metoprolol tartrate 25 mg oral tablet: 1 tab(s) orally 2 times a day (01 Dec 2024 21:07)  NIFEdipine 60 mg oral tablet, extended release: 1 tab(s) orally once a day (01 Dec 2024 21:07)  rosuvastatin 20 mg oral tablet: 1 tab(s) orally once a day (at bedtime) (01 Dec 2024 21:07)  traZODone 50 mg oral tablet: 2 tab(s) orally once a day (at bedtime) (01 Dec 2024 21:05)  Vitron-C 125 mg-65 mg oral tablet: 1 tab(s) orally once a day (01 Dec 2024 21:07)  Vitron-C 125 mg-65 mg oral tablet: 1 tab(s) orally once a day (01 Dec 2024 21:07)                           MEDICATIONS:  STANDING MEDICATIONS  busPIRone 15 milliGRAM(s) Oral two times a day  cefTRIAXone   IVPB 2000 milliGRAM(s) IV Intermittent every 24 hours  divalproex  milliGRAM(s) Oral daily  donepezil 5 milliGRAM(s) Oral at bedtime  enoxaparin Injectable 40 milliGRAM(s) SubCutaneous every 24 hours  folic acid 1 milliGRAM(s) Oral daily  melatonin 5 milliGRAM(s) Oral at bedtime  memantine 5 milliGRAM(s) Oral two times a day  metoprolol tartrate 25 milliGRAM(s) Oral two times a day  multivitamin 1 Tablet(s) Oral daily  NIFEdipine XL 60 milliGRAM(s) Oral daily  rosuvastatin 20 milliGRAM(s) Oral at bedtime  thiamine 100 milliGRAM(s) Oral daily  traZODone 100 milliGRAM(s) Oral at bedtime    PRN MEDICATIONS  acetaminophen     Tablet .. 650 milliGRAM(s) Oral every 6 hours PRN  LORazepam   Injectable 1 milliGRAM(s) IV Push every 2 hours PRN  LORazepam   Injectable 1 milliGRAM(s) IV Push every 1 hour PRN                                            ------------------------------------------------------------  VITAL SIGNS: Last 24 Hours  T(C): 36.4 (03 Dec 2024 08:27), Max: 36.8 (02 Dec 2024 17:16)  T(F): 97.6 (03 Dec 2024 08:27), Max: 98.3 (02 Dec 2024 17:16)  HR: 87 (03 Dec 2024 08:27) (77 - 93)  BP: 129/75 (03 Dec 2024 08:27) (119/62 - 162/73)  BP(mean): --  RR: 18 (03 Dec 2024 08:27) (18 - 19)  SpO2: 97% (03 Dec 2024 08:27) (97% - 98%)                                             --------------------------------------------------------------  LABS:                        10.9   20.46 )-----------( 247      ( 01 Dec 2024 16:56 )             32.1     12-01    133[L]  |  101  |  27[H]  ----------------------------<  120[H]  4.0   |  24  |  1.1    Ca    8.6      01 Dec 2024 23:30  Phos  2.9     12-01  Mg     2.1     12-01    TPro  5.5[L]  /  Alb  3.4[L]  /  TBili  0.5  /  DBili  0.2  /  AST  26  /  ALT  11  /  AlkPhos  85  12-01      Urinalysis Basic - ( 01 Dec 2024 23:30 )    Color: x / Appearance: x / SG: x / pH: x  Gluc: 120 mg/dL / Ketone: x  / Bili: x / Urobili: x   Blood: x / Protein: x / Nitrite: x   Leuk Esterase: x / RBC: x / WBC x   Sq Epi: x / Non Sq Epi: x / Bacteria: x              Urinalysis with Rflx Culture (collected 01 Dec 2024 18:40)    Culture - Blood (collected 01 Dec 2024 18:00)  Source: .Blood BLOOD  Preliminary Report (03 Dec 2024 01:02):    No growth at 24 hours    Culture - Blood (collected 01 Dec 2024 18:00)  Source: .Blood BLOOD  Preliminary Report (03 Dec 2024 01:02):    No growth at 24 hours                                                    -------------------------------------------------------------  RADIOLOGY:                                            --------------------------------------------------------------    PHYSICAL EXAM:  GENERAL: NAD, lying in bed comfortably  HEAD:  Atraumatic, Normocephalic  EYES: EOMI, conjunctiva and sclera clear  ENT: Moist mucous membranes  NECK: Supple, No JVD  CHEST/LUNG: Clear to auscultation bilaterally; No rales, rhonchi, wheezing, or rubs. Unlabored respirations  HEART: regular rate and rhythm; No murmurs, rubs, or gallops  ABDOMEN: Bowel sounds present; Soft, Nontender, Nondistended.    EXTREMITIES: Warm. No clubbing, cyanosis, or edema  NERVOUS SYSTEM:  Alert & Oriented X3. No focal deficits   SKIN: No rashes or lesions                                           --------------------------------------------------------------                 RAFAL MONTEZ 79y Male  MRN#: [id number]     Hospital Day: 2d    Pt is currently admitted with the primary diagnosis of  Sepsis        SUBJECTIVE     Overnight events  None    Subjective complaints  Pt was evaluated this am.                                             ----------------------------------------------------------  OBJECTIVE  PAST MEDICAL & SURGICAL HISTORY  HTN (hypertension)    Alcohol abuse                                              -----------------------------------------------------------  ALLERGIES:  No Known Allergies                                            ------------------------------------------------------------    HOME MEDICATIONS  Home Medications:  busPIRone 15 mg oral tablet: 1 tab(s) orally 2 times a day (01 Dec 2024 21:02)  cholecalciferol: 125 microgram(s) orally once a day (01 Dec 2024 21:06)  Depakote  mg oral tablet, extended release: 1 tab(s) orally once a day (01 Dec 2024 21:07)  donepezil 5 mg oral tablet: 1 tab(s) orally once a day (at bedtime) (01 Dec 2024 21:07)  folic acid: 1 milligram(s) orally once a day (01 Dec 2024 21:07)  magnesium gluconate: 400 milligram(s) orally once a day (at bedtime) (01 Dec 2024 21:07)  melatonin 5 mg oral tablet: 1 tab(s) orally once a day (at bedtime) (01 Dec 2024 21:07)  memantine 5 mg oral tablet: 1 tab(s) orally 2 times a day (01 Dec 2024 21:03)  metoprolol tartrate 25 mg oral tablet: 1 tab(s) orally 2 times a day (01 Dec 2024 21:07)  NIFEdipine 60 mg oral tablet, extended release: 1 tab(s) orally once a day (01 Dec 2024 21:07)  rosuvastatin 20 mg oral tablet: 1 tab(s) orally once a day (at bedtime) (01 Dec 2024 21:07)  traZODone 50 mg oral tablet: 2 tab(s) orally once a day (at bedtime) (01 Dec 2024 21:05)  Vitron-C 125 mg-65 mg oral tablet: 1 tab(s) orally once a day (01 Dec 2024 21:07)  Vitron-C 125 mg-65 mg oral tablet: 1 tab(s) orally once a day (01 Dec 2024 21:07)                           MEDICATIONS:  STANDING MEDICATIONS  busPIRone 15 milliGRAM(s) Oral two times a day  cefTRIAXone   IVPB 2000 milliGRAM(s) IV Intermittent every 24 hours  divalproex  milliGRAM(s) Oral daily  donepezil 5 milliGRAM(s) Oral at bedtime  enoxaparin Injectable 40 milliGRAM(s) SubCutaneous every 24 hours  folic acid 1 milliGRAM(s) Oral daily  melatonin 5 milliGRAM(s) Oral at bedtime  memantine 5 milliGRAM(s) Oral two times a day  metoprolol tartrate 25 milliGRAM(s) Oral two times a day  multivitamin 1 Tablet(s) Oral daily  NIFEdipine XL 60 milliGRAM(s) Oral daily  rosuvastatin 20 milliGRAM(s) Oral at bedtime  thiamine 100 milliGRAM(s) Oral daily  traZODone 100 milliGRAM(s) Oral at bedtime    PRN MEDICATIONS  acetaminophen     Tablet .. 650 milliGRAM(s) Oral every 6 hours PRN  LORazepam   Injectable 1 milliGRAM(s) IV Push every 2 hours PRN  LORazepam   Injectable 1 milliGRAM(s) IV Push every 1 hour PRN                                            ------------------------------------------------------------  VITAL SIGNS: Last 24 Hours  T(C): 36.4 (03 Dec 2024 08:27), Max: 36.8 (02 Dec 2024 17:16)  T(F): 97.6 (03 Dec 2024 08:27), Max: 98.3 (02 Dec 2024 17:16)  HR: 87 (03 Dec 2024 08:27) (77 - 93)  BP: 129/75 (03 Dec 2024 08:27) (119/62 - 162/73)  BP(mean): --  RR: 18 (03 Dec 2024 08:27) (18 - 19)  SpO2: 97% (03 Dec 2024 08:27) (97% - 98%)                                             --------------------------------------------------------------  LABS:                        10.9   20.46 )-----------( 247      ( 01 Dec 2024 16:56 )             32.1     12-01    133[L]  |  101  |  27[H]  ----------------------------<  120[H]  4.0   |  24  |  1.1    Ca    8.6      01 Dec 2024 23:30  Phos  2.9     12-01  Mg     2.1     12-01    TPro  5.5[L]  /  Alb  3.4[L]  /  TBili  0.5  /  DBili  0.2  /  AST  26  /  ALT  11  /  AlkPhos  85  12-01      Urinalysis Basic - ( 01 Dec 2024 23:30 )    Color: x / Appearance: x / SG: x / pH: x  Gluc: 120 mg/dL / Ketone: x  / Bili: x / Urobili: x   Blood: x / Protein: x / Nitrite: x   Leuk Esterase: x / RBC: x / WBC x   Sq Epi: x / Non Sq Epi: x / Bacteria: x              Urinalysis with Rflx Culture (collected 01 Dec 2024 18:40)    Culture - Blood (collected 01 Dec 2024 18:00)  Source: .Blood BLOOD  Preliminary Report (03 Dec 2024 01:02):    No growth at 24 hours    Culture - Blood (collected 01 Dec 2024 18:00)  Source: .Blood BLOOD  Preliminary Report (03 Dec 2024 01:02):    No growth at 24 hours                                                    -------------------------------------------------------------  RADIOLOGY:                                            --------------------------------------------------------------    PHYSICAL EXAM:  GENERAL: NAD, lying in bed comfortably  CHEST/LUNG: Clear to auscultation bilaterally; No rales, rhonchi, wheezing, or rubs. Unlabored respirations  HEART: regular rate and rhythm; No murmurs, rubs, or gallops  ABDOMEN: Bowel sounds present; Soft, Nontender, Nondistended.    EXTREMITIES: Warm. No clubbing, cyanosis, or edema  NERVOUS SYSTEM:  Alert & Oriented X3.                                            --------------------------------------------------------------

## 2024-12-04 ENCOUNTER — TRANSCRIPTION ENCOUNTER (OUTPATIENT)
Age: 79
End: 2024-12-04

## 2024-12-04 VITALS
OXYGEN SATURATION: 95 % | TEMPERATURE: 99 F | SYSTOLIC BLOOD PRESSURE: 131 MMHG | HEART RATE: 76 BPM | RESPIRATION RATE: 19 BRPM | DIASTOLIC BLOOD PRESSURE: 73 MMHG

## 2024-12-04 LAB
BLD GP AB SCN SERPL QL: SIGNIFICANT CHANGE UP
HCT VFR BLD CALC: 30.5 % — LOW (ref 42–52)
HGB BLD-MCNC: 10.2 G/DL — LOW (ref 14–18)
MCHC RBC-ENTMCNC: 32.8 PG — HIGH (ref 27–31)
MCHC RBC-ENTMCNC: 33.4 G/DL — SIGNIFICANT CHANGE UP (ref 32–37)
MCV RBC AUTO: 98.1 FL — HIGH (ref 80–94)
NRBC # BLD: 0 /100 WBCS — SIGNIFICANT CHANGE UP (ref 0–0)
PLATELET # BLD AUTO: 208 K/UL — SIGNIFICANT CHANGE UP (ref 130–400)
PMV BLD: 11.8 FL — HIGH (ref 7.4–10.4)
RBC # BLD: 3.11 M/UL — LOW (ref 4.7–6.1)
RBC # FLD: 13.1 % — SIGNIFICANT CHANGE UP (ref 11.5–14.5)
WBC # BLD: 10 K/UL — SIGNIFICANT CHANGE UP (ref 4.8–10.8)
WBC # FLD AUTO: 10 K/UL — SIGNIFICANT CHANGE UP (ref 4.8–10.8)

## 2024-12-04 PROCEDURE — 99239 HOSP IP/OBS DSCHRG MGMT >30: CPT

## 2024-12-04 RX ORDER — IRON ASPGLY,PS/C/B12/FA/CA/SUC 150-25-1
1 CAPSULE ORAL
Qty: 0 | Refills: 0 | DISCHARGE

## 2024-12-04 RX ORDER — IRON,CARBONYL/ASCORBIC ACID 65MG-125MG
1 TABLET, DELAYED RELEASE (ENTERIC COATED) ORAL
Refills: 0 | DISCHARGE

## 2024-12-04 RX ADMIN — Medication 1 MILLIGRAM(S): at 11:22

## 2024-12-04 RX ADMIN — Medication 15 MILLIGRAM(S): at 05:33

## 2024-12-04 RX ADMIN — ENOXAPARIN SODIUM 40 MILLIGRAM(S): 30 INJECTION SUBCUTANEOUS at 11:22

## 2024-12-04 RX ADMIN — Medication 100 MILLIGRAM(S): at 11:22

## 2024-12-04 RX ADMIN — Medication 250 MILLIGRAM(S): at 11:22

## 2024-12-04 RX ADMIN — Medication 60 MILLIGRAM(S): at 05:33

## 2024-12-04 RX ADMIN — METOPROLOL TARTRATE 25 MILLIGRAM(S): 100 TABLET, FILM COATED ORAL at 17:44

## 2024-12-04 RX ADMIN — Medication 100 MILLIGRAM(S): at 11:23

## 2024-12-04 RX ADMIN — Medication 1 TABLET(S): at 11:22

## 2024-12-04 RX ADMIN — Medication 15 MILLIGRAM(S): at 17:45

## 2024-12-04 RX ADMIN — METOPROLOL TARTRATE 25 MILLIGRAM(S): 100 TABLET, FILM COATED ORAL at 05:33

## 2024-12-04 RX ADMIN — MEMANTINE HYDROCHLORIDE 5 MILLIGRAM(S): 14 CAPSULE, EXTENDED RELEASE ORAL at 17:44

## 2024-12-04 RX ADMIN — MEMANTINE HYDROCHLORIDE 5 MILLIGRAM(S): 14 CAPSULE, EXTENDED RELEASE ORAL at 05:33

## 2024-12-04 NOTE — DISCHARGE NOTE NURSING/CASE MANAGEMENT/SOCIAL WORK - PATIENT PORTAL LINK FT
You can access the FollowMyHealth Patient Portal offered by [facility] by registering at the following website: [email or web address]. By joining [employer]’s FollowMyHealth portal, you will also be able to view your health information using other applications (apps) compatible with our system.

## 2024-12-04 NOTE — DISCHARGE NOTE NURSING/CASE MANAGEMENT/SOCIAL WORK - FINANCIAL ASSISTANCE
[facility] provides services at a reduced cost to those who are determined to be eligible through [facility]’s financial assistance program. Information regarding [facility]’s financial assistance program can be found by going to [email or web address] or by calling [phone].

## 2024-12-04 NOTE — DISCHARGE NOTE PROVIDER - NSDCCPCAREPLAN_GEN_ALL_CORE_FT
PRINCIPAL DISCHARGE DIAGNOSIS  Diagnosis: Acute UTI  Assessment and Plan of Treatment: You came in to the ED and we performed labowrk and imaging that was concerning for an infection. We treated you with antibiotics and you got better.   Please continue all of your prescribed medications and follow up with your PCP.   Do you need a primary care doctor or follow-up with a specialist? Our care coordinators will help you find providers near you and schedule any follow-up care visits.  Monday-Friday: 9am-5pm  Call our CareBridge team: [phone]      SECONDARY DISCHARGE DIAGNOSES  Diagnosis: Leukocytosis  Assessment and Plan of Treatment:     Diagnosis: Acute UTI  Assessment and Plan of Treatment:

## 2024-12-04 NOTE — DISCHARGE NOTE PROVIDER - PROVIDER TOKENS
PROVIDER:[TOKEN:[[id number]],FOLLOWUP:[2 weeks]] PROVIDER:[TOKEN:[[id number]],FOLLOWUP:[2 weeks]],PROVIDER:[TOKEN:[[id number]],FOLLOWUP:[2 weeks]]

## 2024-12-04 NOTE — CHART NOTE - NSCHARTNOTEFT_GEN_A_CORE
rEEG reviewed was unremarkable. Symptoms likely 2/2 alcohol withdrawal.     Recommendation:  Treat alcohol withdrawal  No need for AEDS   Can f/u OP neurology for cognitive impairment/memory loss    Discussed with attending. Neurology will sign off. Please reach out for any changes in neurologic status.

## 2024-12-04 NOTE — DISCHARGE NOTE PROVIDER - CARE PROVIDERS DIRECT ADDRESSES
[email or web address]

## 2024-12-04 NOTE — DISCHARGE NOTE PROVIDER - NSDCMRMEDTOKEN_GEN_ALL_CORE_FT
busPIRone 15 mg oral tablet: 1 tab(s) orally 2 times a day  cholecalciferol: 125 microgram(s) orally once a day  Depakote  mg oral tablet, extended release: 1 tab(s) orally once a day  donepezil 5 mg oral tablet: 1 tab(s) orally once a day (at bedtime)  folic acid: 1 milligram(s) orally once a day  magnesium gluconate: 400 milligram(s) orally once a day (at bedtime)  melatonin 5 mg oral tablet: 1 tab(s) orally once a day (at bedtime)  memantine 5 mg oral tablet: 1 tab(s) orally 2 times a day  metoprolol tartrate 25 mg oral tablet: 1 tab(s) orally 2 times a day  NIFEdipine 60 mg oral tablet, extended release: 1 tab(s) orally once a day  Niva-Plus oral tablet: 1 tab(s) orally once a day  rosuvastatin 20 mg oral tablet: 1 tab(s) orally once a day (at bedtime)  traZODone 50 mg oral tablet: 2 tab(s) orally once a day (at bedtime)  Vitron-C 125 mg-65 mg oral tablet: 1 tab(s) orally once a day

## 2024-12-04 NOTE — PROGRESS NOTE ADULT - ASSESSMENT
79 M w/ PMHx of dementia, HTN, alcohol abuse, alcohol withdrawal seizure, multiple recent admissions for fall/syncope, seizure episode was brought from the  [facility] for evaluation of the seizure episode.    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 Findings consistent with mild diffuse electrocerebral dysfunction secondary to nonspecific etiology  - urine drug screen positive for THC  - delirium precaution  - seizure precautions  -check orthostatics    #Sepsis 2/2 Acute UTI  -  IV CTX for 3 days  -  Bcx 12/1 negative  -  ucx negative    #Anemia  -hb 10.2  - no signs of bleeding     #HFpEF  #mild pHTN   #CKD 3  - last TTE 06/24: G2DD w/ mild pHTN, basal septal hypertrophy  - xray chest-- mild pulm vascular congestion  -  adjust meds per GFR  - strict in/output record    #HTN  #HLD  - cw nifedipine 60mg qd  - c/w rosuvastatin 20mg qhs     #Anxiety  #Mood disorder  - cw Depakote 250mg qd   - cw buspirone 15mg BID    ---------------------  DC home today-- spent more than 30mins

## 2024-12-04 NOTE — DISCHARGE NOTE PROVIDER - CARE PROVIDER_API CALL
Nellie Almeida  Internal Medicine  [address] [place] [address]  Phone: [phone]  Fax: [phone]  Follow Up Time: 2 weeks   Nellie Almeida  Internal Medicine  [address] [place] [address]  Phone: [phone]  Fax: [phone]  Follow Up Time: 2 weeks    Dimitris Yen  Neurology  [address] [place] [address], [address]  [place] [address]  Phone: [phone]  Fax: [phone]  Follow Up Time: 2 weeks

## 2024-12-04 NOTE — DISCHARGE NOTE PROVIDER - HOSPITAL COURSE
HPI:  79 M w/ PMHx of dementia, HTN, alcohol abuse, alcohol withdrawal seizure, multiple recent admissions for fall/syncope, seizure episode was brought from the  [facility] for evaluation of the seizure episode. Patient is aox3 at time of the interview. Patient states that he is completely fine and does not know the reason why he was brought to the ed. He further states that he want to go home, as he does not have any illness. Called the Facility multiple times via phone call  and spoke to Desi. She states that she does not have any information why patient was brought to the ed and recommended to call in am after 10 o clock.  Hx taken from the chart. On chart review, Patient had seizure while on bed today witnessed by staff at assisted living , no falls in injuries.Pt reportedly had episode where his whole body was shaking and eyes had rolled back. Episode was reportedly brief. On review of the systems, he denies any headache, dizziness, loss of consciousness chest pain, shortness of breath, palpitations nausea, vomit, diarrhea, constipation, dysuria, frequency, urgency, extremity weakness , and other significant complaints..     ED VITALS: 154/70, HR 84, RR 18, SpO2 95% on RA, T 36.1C  LABS: wbc 20k, hb 10, trops 47,46, gfr 56, urine + 10 wbc, trace leukocyte esterase, rvp -ve, JOVITA (-)  Imaging: ct head: no acute pathology, xray chest: mild pulm vascular congestion,   ekg: normal sinus rythym, no acute t wave and st segment changes        (01 Dec 2024 21:15)    Hospital Course:  Patient was admitted for questionable witnessed seizure event in the setting of leukocytosis. Blood and urine cultures were NGTD. Patient empirically treated with IV ceftriaxone x3 days for presumed UTI. Urine drug screen (+) for THC. EEG was performed and showed no seizure, mild diffuse electrocerebral dysfunction, nonspecific. Neurology was consulted, recommended to continue current management. Due to poor collateral, it is unclear what triggered this event or what may have happened ,however workup was negative and patient is stable.     Patient is medically and hemodynamically stable, ready for discharge.    Discussion of discharge plan of care, including discharge diagnoses, medication reconciliation, and follow-ups was conducted with Dr. Parada on 12/4/2024 at 12PM, and discharge was approved.    Important Medication Changes and Reason:  [] none    Active or Pending Issues Requiring Follow-up:  [] f/u PCP    Discharge Diagnoses:  [] UTI     HPI:  79 M w/ PMHx of dementia, HTN, alcohol abuse, alcohol withdrawal seizure, multiple recent admissions for fall/syncope, seizure episode was brought from the  [facility] for evaluation of the seizure episode. Patient is aox3 at time of the interview. Patient states that he is completely fine and does not know the reason why he was brought to the ed. He further states that he want to go home, as he does not have any illness. Called the Facility multiple times via phone call  and spoke to Desi. She states that she does not have any information why patient was brought to the ed and recommended to call in am after 10 o clock.  Hx taken from the chart. On chart review, Patient had seizure while on bed today witnessed by staff at assisted living , no falls in injuries.Pt reportedly had episode where his whole body was shaking and eyes had rolled back. Episode was reportedly brief. On review of the systems, he denies any headache, dizziness, loss of consciousness chest pain, shortness of breath, palpitations nausea, vomit, diarrhea, constipation, dysuria, frequency, urgency, extremity weakness , and other significant complaints..     ED VITALS: 154/70, HR 84, RR 18, SpO2 95% on RA, T 36.1C  LABS: wbc 20k, hb 10, trops 47,46, gfr 56, urine + 10 wbc, trace leukocyte esterase, rvp -ve, JOVITA (-)  Imaging: ct head: no acute pathology, xray chest: mild pulm vascular congestion,   ekg: normal sinus rythym, no acute t wave and st segment changes        (01 Dec 2024 21:15)    Hospital Course:  Patient was admitted for questionable witnessed seizure event in the setting of leukocytosis. Blood and urine cultures were NGTD. Patient empirically treated with IV ceftriaxone x3 days for presumed UTI. Urine drug screen (+) for THC. EEG was performed and showed no seizure, mild diffuse electrocerebral dysfunction, nonspecific. Neurology was consulted, recommended to continue current management. Due to poor collateral, it is unclear what triggered this event or what may have happened ,however workup was negative and patient is stable.     Patient is medically and hemodynamically stable, ready for discharge.    Discussion of discharge plan of care, including discharge diagnoses, medication reconciliation, and follow-ups was conducted with Dr. Paraad on 12/4/2024 at 12PM, and discharge was approved.    Important Medication Changes and Reason:  [] none    Active or Pending Issues Requiring Follow-up:  [] f/u PCP  [] f/u neurology for dementia workup    Discharge Diagnoses:  [] UTI

## 2024-12-06 LAB
CARBOXYTHC UR CFM-MCNC: 80 NG/ML — HIGH
TOXICOLOGIST REVIEW: POSITIVE — SIGNIFICANT CHANGE UP

## 2024-12-07 LAB
CULTURE RESULTS: SIGNIFICANT CHANGE UP
CULTURE RESULTS: SIGNIFICANT CHANGE UP
PETH 16:0/18:1: NEGATIVE NG/ML — SIGNIFICANT CHANGE UP
PETH 16:0/18:2: NEGATIVE NG/ML — SIGNIFICANT CHANGE UP
PETH COMMENTS: SIGNIFICANT CHANGE UP
SPECIMEN SOURCE: SIGNIFICANT CHANGE UP
SPECIMEN SOURCE: SIGNIFICANT CHANGE UP

## 2024-12-16 NOTE — CDI QUERY NOTE - [id number]_GEN_ALL_CORE_HH
There is inconsistent documentation in the medical record regarding the patient’s condition.      In order to ensure accurate coding and accuracy of the clinical record, the documentation in this patient’s medical record requires additional clarification.      The diagnosis of sepsis has been documented in the medical record on [Date] by [Provider]:    Please review the documentation in the medical record and clarify the appropriate diagnosis (along with any supporting documentation) in your progress note and/or discharge summary.    • Sepsis 2/2 Acute UTI has been ruled in.  • Sepsis 2/2 Acute UTI has been ruled out.  • Acute UTI was treated and resolved with sepsis ruled out  • Other (please specify):    Supporting documentation and/or clinical evidence:     12/1 ED Provider Note: ... Principal Discharge DX: Sepsis. Secondary Diagnosis: Leukocytosis. Secondary Diagnosis:	Acute UTI ... Patient presented with reported shaking episode, possibly seizure activity versus syncope prior to arrival.  On arrival to ED, patient febrile by rectal temp with evidence of sepsis and therefore was treated with broad-spectrum antibiotics and IV fluids    12/1 H&P Adult: ...  Attestation Statements: ... Urosepsis / Acute UTI -IV abx -check Ucx and blood Cx    12/2  Progress Note Adult-Internal Medicine Resident/Attending: ... Sepsis 2/2 ?Acute UTI- UA 10WBC, trace LE- sp vancomycin and cefepime in ed - c/w IV CTX for now- f/u Bcx, ucx    12/4 Progress Note Adult-Hospitalist Attending: ... Sepsis 2/2 Acute UTI-  IV CTX for 3 days-  Bcx 12/1 negative-  ucx negative    12/4 Discharge Note Provider: ... urine + 10 wbc, trace leukocyte esterase ... Patient was admitted for questionable witnessed seizure event in the setting of leukocytosis. Blood and urine cultures were NGTD. Patient empirically treated with IV ceftriaxone x3 days for presumed UTI ... PRINCIPAL DISCHARGE DIAGNOSIS: Acute UTI. Assessment and Plan of Treatment: You came in to the ED and we performed labowrk and imaging that was concerning for an infection. We treated you with antibiotics and you got better.     Lab findings: 12/1 WBC-20.46    Nursing VS Flowsheet: 12/1 Temp@5465-97, @1750-100.4     Per MAR: Vancomycin IVPB. Indication: infx. Administered 12/1                 Cefepime IVPB. Indication: infx. Administered 12/1                 Rocephin IVPB. Indication: Genitourinary infection. Administered 12/2-12/4                 Tylenol 650mg, oral, once. Administered 12/1@1813                 NaCl 0.9% IVF bolus, 2300 ml. Administered 12/1    In responding to this request, please exercise your independent professional judgment. The fact that a question is asked does not imply that any particular answer is desired or expected. Documentation clarification is required for compliance.    Thank you,  Selene Hernandez RN [facility]  [phone]

## 2025-03-24 NOTE — ED PROVIDER NOTE - DISPOSITION TYPE
ADMIT
Call bell/Explanation of exam/test/Fall precautions/Bedside visitors/Position of comfort/Side rails